# Patient Record
Sex: MALE | NOT HISPANIC OR LATINO | Employment: FULL TIME | URBAN - METROPOLITAN AREA
[De-identification: names, ages, dates, MRNs, and addresses within clinical notes are randomized per-mention and may not be internally consistent; named-entity substitution may affect disease eponyms.]

---

## 2022-04-09 ENCOUNTER — APPOINTMENT (OUTPATIENT)
Dept: RADIOLOGY | Facility: MEDICAL CENTER | Age: 47
DRG: 199 | End: 2022-04-09
Attending: EMERGENCY MEDICINE
Payer: COMMERCIAL

## 2022-04-10 ENCOUNTER — APPOINTMENT (OUTPATIENT)
Dept: RADIOLOGY | Facility: MEDICAL CENTER | Age: 47
DRG: 199 | End: 2022-04-10
Attending: EMERGENCY MEDICINE
Payer: COMMERCIAL

## 2022-04-10 ENCOUNTER — HOSPITAL ENCOUNTER (INPATIENT)
Facility: MEDICAL CENTER | Age: 47
LOS: 4 days | DRG: 199 | End: 2022-04-14
Attending: EMERGENCY MEDICINE | Admitting: SURGERY
Payer: COMMERCIAL

## 2022-04-10 ENCOUNTER — APPOINTMENT (OUTPATIENT)
Dept: RADIOLOGY | Facility: MEDICAL CENTER | Age: 47
DRG: 199 | End: 2022-04-10
Attending: SURGERY
Payer: COMMERCIAL

## 2022-04-10 DIAGNOSIS — S42.022A CLOSED DISPLACED FRACTURE OF SHAFT OF LEFT CLAVICLE, INITIAL ENCOUNTER: ICD-10-CM
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
## 2022-04-10 DIAGNOSIS — S22.42XA CLOSED FRACTURE OF MULTIPLE RIBS OF LEFT SIDE, INITIAL ENCOUNTER: ICD-10-CM

## 2022-04-10 DIAGNOSIS — J94.2 HEMOPNEUMOTHORAX: ICD-10-CM

## 2022-04-10 DIAGNOSIS — R09.02 HYPOXIA: ICD-10-CM

## 2022-04-10 PROBLEM — Z11.52 ENCOUNTER FOR SCREENING LABORATORY TESTING FOR COVID-19 VIRUS: Status: ACTIVE | Noted: 2022-04-10

## 2022-04-10 PROBLEM — S22.5XXA CLOSED FRACTURE OF MULTIPLE RIBS WITH FLAIL CHEST: Status: ACTIVE | Noted: 2022-04-10

## 2022-04-10 PROBLEM — T14.90XA TRAUMA: Status: ACTIVE | Noted: 2022-04-10

## 2022-04-10 PROBLEM — S22.43XA CLOSED FRACTURE OF MULTIPLE RIBS OF BOTH SIDES: Status: ACTIVE | Noted: 2022-04-10

## 2022-04-10 PROBLEM — S27.321A LEFT PULMONARY CONTUSION: Status: ACTIVE | Noted: 2022-04-10

## 2022-04-10 PROBLEM — S27.2XXA TRAUMATIC HEMO-PNEUMOTHORAX: Status: ACTIVE | Noted: 2022-04-10

## 2022-04-10 PROBLEM — Z53.09 CONTRAINDICATION TO DEEP VEIN THROMBOSIS (DVT) PROPHYLAXIS: Status: ACTIVE | Noted: 2022-04-10

## 2022-04-10 LAB
ABO GROUP BLD: NORMAL
ALBUMIN SERPL BCP-MCNC: 4.2 G/DL (ref 3.2–4.9)
ALBUMIN/GLOB SERPL: 1.2 G/DL
ALP SERPL-CCNC: 141 U/L (ref 30–99)
ALT SERPL-CCNC: 39 U/L (ref 2–50)
ANION GAP SERPL CALC-SCNC: 13 MMOL/L (ref 7–16)
APTT PPP: 24.2 SEC (ref 24.7–36)
AST SERPL-CCNC: 43 U/L (ref 12–45)
BILIRUB SERPL-MCNC: 0.3 MG/DL (ref 0.1–1.5)
BLD GP AB SCN SERPL QL: NORMAL
BUN SERPL-MCNC: 15 MG/DL (ref 8–22)
CALCIUM SERPL-MCNC: 9.7 MG/DL (ref 8.5–10.5)
CHLORIDE SERPL-SCNC: 102 MMOL/L (ref 96–112)
CO2 SERPL-SCNC: 21 MMOL/L (ref 20–33)
CREAT SERPL-MCNC: 0.96 MG/DL (ref 0.5–1.4)
ERYTHROCYTE [DISTWIDTH] IN BLOOD BY AUTOMATED COUNT: 38.1 FL (ref 35.9–50)
ETHANOL BLD-MCNC: <10.1 MG/DL (ref 0–10)
FLUAV RNA SPEC QL NAA+PROBE: NEGATIVE
FLUBV RNA SPEC QL NAA+PROBE: NEGATIVE
GFR SERPLBLD CREATININE-BSD FMLA CKD-EPI: 99 ML/MIN/1.73 M 2
GLOBULIN SER CALC-MCNC: 3.6 G/DL (ref 1.9–3.5)
GLUCOSE BLD STRIP.AUTO-MCNC: 123 MG/DL (ref 65–99)
GLUCOSE BLD STRIP.AUTO-MCNC: 138 MG/DL (ref 65–99)
GLUCOSE SERPL-MCNC: 135 MG/DL (ref 65–99)
HCT VFR BLD AUTO: 45.6 % (ref 42–52)
HGB BLD-MCNC: 15.4 G/DL (ref 14–18)
INR PPP: 1.03 (ref 0.87–1.13)
MAGNESIUM SERPL-MCNC: 1.8 MG/DL (ref 1.5–2.5)
MCH RBC QN AUTO: 27.9 PG (ref 27–33)
MCHC RBC AUTO-ENTMCNC: 33.8 G/DL (ref 33.7–35.3)
MCV RBC AUTO: 82.6 FL (ref 81.4–97.8)
PHOSPHATE SERPL-MCNC: 2.8 MG/DL (ref 2.5–4.5)
PLATELET # BLD AUTO: 406 K/UL (ref 164–446)
PMV BLD AUTO: 10.6 FL (ref 9–12.9)
POTASSIUM SERPL-SCNC: 3.5 MMOL/L (ref 3.6–5.5)
PROT SERPL-MCNC: 7.8 G/DL (ref 6–8.2)
PROTHROMBIN TIME: 13.2 SEC (ref 12–14.6)
RBC # BLD AUTO: 5.52 M/UL (ref 4.7–6.1)
RH BLD: NORMAL
RSV RNA SPEC QL NAA+PROBE: NEGATIVE
SARS-COV-2 RNA RESP QL NAA+PROBE: NOTDETECTED
SODIUM SERPL-SCNC: 136 MMOL/L (ref 135–145)
SPECIMEN SOURCE: NORMAL
WBC # BLD AUTO: 20.6 K/UL (ref 4.8–10.8)

## 2022-04-10 PROCEDURE — 86901 BLOOD TYPING SEROLOGIC RH(D): CPT

## 2022-04-10 PROCEDURE — 72128 CT CHEST SPINE W/O DYE: CPT

## 2022-04-10 PROCEDURE — 700101 HCHG RX REV CODE 250: Performed by: SURGERY

## 2022-04-10 PROCEDURE — 700111 HCHG RX REV CODE 636 W/ 250 OVERRIDE (IP): Performed by: NURSE PRACTITIONER

## 2022-04-10 PROCEDURE — 85027 COMPLETE CBC AUTOMATED: CPT

## 2022-04-10 PROCEDURE — 82962 GLUCOSE BLOOD TEST: CPT | Mod: 91

## 2022-04-10 PROCEDURE — 73030 X-RAY EXAM OF SHOULDER: CPT | Mod: LT

## 2022-04-10 PROCEDURE — 0241U HCHG SARS-COV-2 COVID-19 NFCT DS RESP RNA 4 TRGT MIC: CPT

## 2022-04-10 PROCEDURE — 82077 ASSAY SPEC XCP UR&BREATH IA: CPT

## 2022-04-10 PROCEDURE — 700111 HCHG RX REV CODE 636 W/ 250 OVERRIDE (IP): Performed by: SURGERY

## 2022-04-10 PROCEDURE — 85610 PROTHROMBIN TIME: CPT

## 2022-04-10 PROCEDURE — 83735 ASSAY OF MAGNESIUM: CPT

## 2022-04-10 PROCEDURE — 85730 THROMBOPLASTIN TIME PARTIAL: CPT

## 2022-04-10 PROCEDURE — 72125 CT NECK SPINE W/O DYE: CPT

## 2022-04-10 PROCEDURE — 96374 THER/PROPH/DIAG INJ IV PUSH: CPT

## 2022-04-10 PROCEDURE — 72131 CT LUMBAR SPINE W/O DYE: CPT

## 2022-04-10 PROCEDURE — 3E0234Z INTRODUCTION OF SERUM, TOXOID AND VACCINE INTO MUSCLE, PERCUTANEOUS APPROACH: ICD-10-PCS | Performed by: EMERGENCY MEDICINE

## 2022-04-10 PROCEDURE — 99291 CRITICAL CARE FIRST HOUR: CPT

## 2022-04-10 PROCEDURE — G0390 TRAUMA RESPONS W/HOSP CRITI: HCPCS

## 2022-04-10 PROCEDURE — 700111 HCHG RX REV CODE 636 W/ 250 OVERRIDE (IP): Performed by: EMERGENCY MEDICINE

## 2022-04-10 PROCEDURE — 71260 CT THORAX DX C+: CPT

## 2022-04-10 PROCEDURE — 72170 X-RAY EXAM OF PELVIS: CPT

## 2022-04-10 PROCEDURE — 90471 IMMUNIZATION ADMIN: CPT

## 2022-04-10 PROCEDURE — 70486 CT MAXILLOFACIAL W/O DYE: CPT

## 2022-04-10 PROCEDURE — 84100 ASSAY OF PHOSPHORUS: CPT

## 2022-04-10 PROCEDURE — 700117 HCHG RX CONTRAST REV CODE 255: Performed by: EMERGENCY MEDICINE

## 2022-04-10 PROCEDURE — 70450 CT HEAD/BRAIN W/O DYE: CPT

## 2022-04-10 PROCEDURE — 94669 MECHANICAL CHEST WALL OSCILL: CPT

## 2022-04-10 PROCEDURE — 86900 BLOOD TYPING SEROLOGIC ABO: CPT

## 2022-04-10 PROCEDURE — 770022 HCHG ROOM/CARE - ICU (200)

## 2022-04-10 PROCEDURE — C9803 HOPD COVID-19 SPEC COLLECT: HCPCS | Performed by: PHYSICIAN ASSISTANT

## 2022-04-10 PROCEDURE — 71045 X-RAY EXAM CHEST 1 VIEW: CPT

## 2022-04-10 PROCEDURE — A9270 NON-COVERED ITEM OR SERVICE: HCPCS | Performed by: SURGERY

## 2022-04-10 PROCEDURE — 700102 HCHG RX REV CODE 250 W/ 637 OVERRIDE(OP): Performed by: SURGERY

## 2022-04-10 PROCEDURE — 90715 TDAP VACCINE 7 YRS/> IM: CPT | Performed by: EMERGENCY MEDICINE

## 2022-04-10 PROCEDURE — 700105 HCHG RX REV CODE 258: Performed by: SURGERY

## 2022-04-10 PROCEDURE — 99222 1ST HOSP IP/OBS MODERATE 55: CPT | Performed by: ORTHOPAEDIC SURGERY

## 2022-04-10 PROCEDURE — 99291 CRITICAL CARE FIRST HOUR: CPT | Performed by: SURGERY

## 2022-04-10 PROCEDURE — 86850 RBC ANTIBODY SCREEN: CPT

## 2022-04-10 PROCEDURE — 80053 COMPREHEN METABOLIC PANEL: CPT

## 2022-04-10 RX ORDER — AMOXICILLIN 250 MG
1 CAPSULE ORAL NIGHTLY
Status: DISCONTINUED | OUTPATIENT
Start: 2022-04-10 | End: 2022-04-14 | Stop reason: HOSPADM

## 2022-04-10 RX ORDER — DEXTROSE MONOHYDRATE 25 G/50ML
25 INJECTION, SOLUTION INTRAVENOUS
Status: DISCONTINUED | OUTPATIENT
Start: 2022-04-10 | End: 2022-04-13

## 2022-04-10 RX ORDER — HYDROMORPHONE HYDROCHLORIDE 1 MG/ML
.5-1 INJECTION, SOLUTION INTRAMUSCULAR; INTRAVENOUS; SUBCUTANEOUS
Status: DISCONTINUED | OUTPATIENT
Start: 2022-04-10 | End: 2022-04-12

## 2022-04-10 RX ORDER — ACETAMINOPHEN 500 MG
1000 TABLET ORAL EVERY 6 HOURS
Status: DISCONTINUED | OUTPATIENT
Start: 2022-04-10 | End: 2022-04-14 | Stop reason: HOSPADM

## 2022-04-10 RX ORDER — METAXALONE 800 MG/1
800 TABLET ORAL 3 TIMES DAILY
Status: DISCONTINUED | OUTPATIENT
Start: 2022-04-10 | End: 2022-04-14 | Stop reason: HOSPADM

## 2022-04-10 RX ORDER — BISACODYL 10 MG
10 SUPPOSITORY, RECTAL RECTAL
Status: DISCONTINUED | OUTPATIENT
Start: 2022-04-10 | End: 2022-04-14 | Stop reason: HOSPADM

## 2022-04-10 RX ORDER — POLYETHYLENE GLYCOL 3350 17 G/17G
1 POWDER, FOR SOLUTION ORAL 2 TIMES DAILY
Status: DISCONTINUED | OUTPATIENT
Start: 2022-04-10 | End: 2022-04-14 | Stop reason: HOSPADM

## 2022-04-10 RX ORDER — SODIUM CHLORIDE, SODIUM LACTATE, POTASSIUM CHLORIDE, CALCIUM CHLORIDE 600; 310; 30; 20 MG/100ML; MG/100ML; MG/100ML; MG/100ML
INJECTION, SOLUTION INTRAVENOUS CONTINUOUS
Status: DISCONTINUED | OUTPATIENT
Start: 2022-04-10 | End: 2022-04-13

## 2022-04-10 RX ORDER — OXYCODONE HYDROCHLORIDE 10 MG/1
10 TABLET ORAL
Status: DISCONTINUED | OUTPATIENT
Start: 2022-04-10 | End: 2022-04-13

## 2022-04-10 RX ORDER — CELECOXIB 200 MG/1
200 CAPSULE ORAL 2 TIMES DAILY
Status: DISCONTINUED | OUTPATIENT
Start: 2022-04-10 | End: 2022-04-14 | Stop reason: HOSPADM

## 2022-04-10 RX ORDER — IBUPROFEN 200 MG
800 TABLET ORAL EVERY 8 HOURS PRN
Status: ON HOLD | COMMUNITY
End: 2022-04-14

## 2022-04-10 RX ORDER — AMOXICILLIN 250 MG
1 CAPSULE ORAL
Status: DISCONTINUED | OUTPATIENT
Start: 2022-04-10 | End: 2022-04-14 | Stop reason: HOSPADM

## 2022-04-10 RX ORDER — ONDANSETRON 2 MG/ML
4 INJECTION INTRAMUSCULAR; INTRAVENOUS EVERY 4 HOURS PRN
Status: DISCONTINUED | OUTPATIENT
Start: 2022-04-10 | End: 2022-04-13

## 2022-04-10 RX ORDER — OXYCODONE HYDROCHLORIDE 10 MG/1
10 TABLET ORAL
Status: DISCONTINUED | OUTPATIENT
Start: 2022-04-10 | End: 2022-04-10

## 2022-04-10 RX ORDER — ACETAMINOPHEN 500 MG
1000 TABLET ORAL EVERY 6 HOURS PRN
Status: DISCONTINUED | OUTPATIENT
Start: 2022-04-15 | End: 2022-04-14 | Stop reason: HOSPADM

## 2022-04-10 RX ORDER — FAMOTIDINE 20 MG/1
20 TABLET, FILM COATED ORAL 2 TIMES DAILY
Status: DISCONTINUED | OUTPATIENT
Start: 2022-04-10 | End: 2022-04-13

## 2022-04-10 RX ORDER — ENEMA 19; 7 G/133ML; G/133ML
1 ENEMA RECTAL
Status: DISCONTINUED | OUTPATIENT
Start: 2022-04-10 | End: 2022-04-14 | Stop reason: HOSPADM

## 2022-04-10 RX ORDER — OXYCODONE HYDROCHLORIDE 5 MG/1
5 TABLET ORAL
Status: DISCONTINUED | OUTPATIENT
Start: 2022-04-10 | End: 2022-04-10

## 2022-04-10 RX ORDER — HYDROMORPHONE HYDROCHLORIDE 1 MG/ML
0.5 INJECTION, SOLUTION INTRAMUSCULAR; INTRAVENOUS; SUBCUTANEOUS
Status: DISCONTINUED | OUTPATIENT
Start: 2022-04-10 | End: 2022-04-10

## 2022-04-10 RX ORDER — HYDROMORPHONE HYDROCHLORIDE 1 MG/ML
1 INJECTION, SOLUTION INTRAMUSCULAR; INTRAVENOUS; SUBCUTANEOUS ONCE
Status: COMPLETED | OUTPATIENT
Start: 2022-04-10 | End: 2022-04-10

## 2022-04-10 RX ORDER — DOCUSATE SODIUM 100 MG/1
100 CAPSULE, LIQUID FILLED ORAL 2 TIMES DAILY
Status: DISCONTINUED | OUTPATIENT
Start: 2022-04-10 | End: 2022-04-14 | Stop reason: HOSPADM

## 2022-04-10 RX ORDER — CELECOXIB 200 MG/1
200 CAPSULE ORAL 2 TIMES DAILY PRN
Status: DISCONTINUED | OUTPATIENT
Start: 2022-04-15 | End: 2022-04-14 | Stop reason: HOSPADM

## 2022-04-10 RX ORDER — OXYCODONE HYDROCHLORIDE 5 MG/1
5 TABLET ORAL
Status: DISCONTINUED | OUTPATIENT
Start: 2022-04-10 | End: 2022-04-13

## 2022-04-10 RX ORDER — LIDOCAINE 50 MG/G
3 PATCH TOPICAL EVERY 24 HOURS
Status: DISCONTINUED | OUTPATIENT
Start: 2022-04-10 | End: 2022-04-14 | Stop reason: HOSPADM

## 2022-04-10 RX ORDER — ONDANSETRON 4 MG/1
4 TABLET, ORALLY DISINTEGRATING ORAL EVERY 4 HOURS PRN
Status: DISCONTINUED | OUTPATIENT
Start: 2022-04-10 | End: 2022-04-14 | Stop reason: HOSPADM

## 2022-04-10 RX ORDER — GABAPENTIN 100 MG/1
100 CAPSULE ORAL EVERY 8 HOURS
Status: DISCONTINUED | OUTPATIENT
Start: 2022-04-10 | End: 2022-04-14 | Stop reason: HOSPADM

## 2022-04-10 RX ADMIN — OXYCODONE HYDROCHLORIDE 10 MG: 10 TABLET ORAL at 21:43

## 2022-04-10 RX ADMIN — METAXALONE 800 MG: 800 TABLET ORAL at 17:08

## 2022-04-10 RX ADMIN — CLOSTRIDIUM TETANI TOXOID ANTIGEN (FORMALDEHYDE INACTIVATED), CORYNEBACTERIUM DIPHTHERIAE TOXOID ANTIGEN (FORMALDEHYDE INACTIVATED), BORDETELLA PERTUSSIS TOXOID ANTIGEN (GLUTARALDEHYDE INACTIVATED), BORDETELLA PERTUSSIS FILAMENTOUS HEMAGGLUTININ ANTIGEN (FORMALDEHYDE INACTIVATED), BORDETELLA PERTUSSIS PERTACTIN ANTIGEN, AND BORDETELLA PERTUSSIS FIMBRIAE 2/3 ANTIGEN 0.5 ML: 5; 2; 2.5; 5; 3; 5 INJECTION, SUSPENSION INTRAMUSCULAR at 14:47

## 2022-04-10 RX ADMIN — OXYCODONE 5 MG: 5 TABLET ORAL at 18:13

## 2022-04-10 RX ADMIN — HYDROMORPHONE HYDROCHLORIDE 1 MG: 1 INJECTION, SOLUTION INTRAMUSCULAR; INTRAVENOUS; SUBCUTANEOUS at 23:16

## 2022-04-10 RX ADMIN — HYDROMORPHONE HYDROCHLORIDE 0.5 MG: 1 INJECTION, SOLUTION INTRAMUSCULAR; INTRAVENOUS; SUBCUTANEOUS at 20:10

## 2022-04-10 RX ADMIN — HYDROMORPHONE HYDROCHLORIDE 1 MG: 1 INJECTION, SOLUTION INTRAMUSCULAR; INTRAVENOUS; SUBCUTANEOUS at 14:23

## 2022-04-10 RX ADMIN — GABAPENTIN 100 MG: 100 CAPSULE ORAL at 16:56

## 2022-04-10 RX ADMIN — HYDROMORPHONE HYDROCHLORIDE 0.5 MG: 1 INJECTION, SOLUTION INTRAMUSCULAR; INTRAVENOUS; SUBCUTANEOUS at 16:20

## 2022-04-10 RX ADMIN — SENNOSIDES AND DOCUSATE SODIUM 1 TABLET: 50; 8.6 TABLET ORAL at 20:10

## 2022-04-10 RX ADMIN — LIDOCAINE 3 PATCH: 50 PATCH TOPICAL at 16:57

## 2022-04-10 RX ADMIN — DOCUSATE SODIUM 100 MG: 100 CAPSULE, LIQUID FILLED ORAL at 17:08

## 2022-04-10 RX ADMIN — SODIUM CHLORIDE, POTASSIUM CHLORIDE, SODIUM LACTATE AND CALCIUM CHLORIDE: 600; 310; 30; 20 INJECTION, SOLUTION INTRAVENOUS at 16:50

## 2022-04-10 RX ADMIN — IOHEXOL 100 ML: 350 INJECTION, SOLUTION INTRAVENOUS at 14:19

## 2022-04-10 RX ADMIN — CELECOXIB 200 MG: 200 CAPSULE ORAL at 17:08

## 2022-04-10 RX ADMIN — FAMOTIDINE 20 MG: 20 TABLET ORAL at 17:08

## 2022-04-10 RX ADMIN — ACETAMINOPHEN 1000 MG: 500 TABLET ORAL at 23:16

## 2022-04-10 RX ADMIN — ACETAMINOPHEN 1000 MG: 500 TABLET ORAL at 17:08

## 2022-04-10 RX ADMIN — GABAPENTIN 100 MG: 100 CAPSULE ORAL at 21:45

## 2022-04-10 ASSESSMENT — COGNITIVE AND FUNCTIONAL STATUS - GENERAL
CLIMB 3 TO 5 STEPS WITH RAILING: A LOT
STANDING UP FROM CHAIR USING ARMS: A LOT
TOILETING: A LITTLE
DAILY ACTIVITIY SCORE: 16
WALKING IN HOSPITAL ROOM: A LOT
HELP NEEDED FOR BATHING: A LOT
MOVING TO AND FROM BED TO CHAIR: A LOT
MOBILITY SCORE: 12
DRESSING REGULAR LOWER BODY CLOTHING: A LOT
TURNING FROM BACK TO SIDE WHILE IN FLAT BAD: A LOT
EATING MEALS: A LITTLE
SUGGESTED CMS G CODE MODIFIER DAILY ACTIVITY: CK
DRESSING REGULAR UPPER BODY CLOTHING: A LITTLE
SUGGESTED CMS G CODE MODIFIER MOBILITY: CL
PERSONAL GROOMING: A LITTLE
MOVING FROM LYING ON BACK TO SITTING ON SIDE OF FLAT BED: A LOT

## 2022-04-10 ASSESSMENT — PAIN DESCRIPTION - PAIN TYPE
TYPE: ACUTE PAIN

## 2022-04-10 ASSESSMENT — LIFESTYLE VARIABLES
EVER FELT BAD OR GUILTY ABOUT YOUR DRINKING: NO
TOTAL SCORE: 0
ALCOHOL_USE: YES
HAVE YOU EVER FELT YOU SHOULD CUT DOWN ON YOUR DRINKING: NO
EVER HAD A DRINK FIRST THING IN THE MORNING TO STEADY YOUR NERVES TO GET RID OF A HANGOVER: NO
CONSUMPTION TOTAL: NEGATIVE
DOES PATIENT WANT TO STOP DRINKING: NO
TOTAL SCORE: 0
AVERAGE NUMBER OF DAYS PER WEEK YOU HAVE A DRINK CONTAINING ALCOHOL: 0.5
ON A TYPICAL DAY WHEN YOU DRINK ALCOHOL HOW MANY DRINKS DO YOU HAVE: 2
HOW MANY TIMES IN THE PAST YEAR HAVE YOU HAD 5 OR MORE DRINKS IN A DAY: 0
HAVE PEOPLE ANNOYED YOU BY CRITICIZING YOUR DRINKING: NO
TOTAL SCORE: 0

## 2022-04-10 ASSESSMENT — COPD QUESTIONNAIRES
DO YOU EVER COUGH UP ANY MUCUS OR PHLEGM?: NO/ONLY WITH OCCASIONAL COLDS OR INFECTIONS
DURING THE PAST 4 WEEKS HOW MUCH DID YOU FEEL SHORT OF BREATH: NONE/LITTLE OF THE TIME
COPD SCREENING SCORE: 2
HAVE YOU SMOKED AT LEAST 100 CIGARETTES IN YOUR ENTIRE LIFE: YES

## 2022-04-10 ASSESSMENT — PATIENT HEALTH QUESTIONNAIRE - PHQ9
2. FEELING DOWN, DEPRESSED, IRRITABLE, OR HOPELESS: NOT AT ALL
1. LITTLE INTEREST OR PLEASURE IN DOING THINGS: NOT AT ALL
SUM OF ALL RESPONSES TO PHQ9 QUESTIONS 1 AND 2: 0

## 2022-04-10 ASSESSMENT — FIBROSIS 4 INDEX: FIB4 SCORE: 0.76

## 2022-04-10 NOTE — PROGRESS NOTES
Orthopaedics  Aware of admit. Chart, X-rays labs all reviewed  Formal consult to follow  Non op clavicle fracture  Multiple rib fractures

## 2022-04-10 NOTE — ASSESSMENT & PLAN NOTE
Left 1-9 rib fractures - flail segment involving ribs 3, 4,and 5.  Right 1st rib fracture.  Aggressive pulmonary hygiene and multimodal pain management and serial chest radiography.
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
## 2022-04-10 NOTE — ASSESSMENT & PLAN NOTE
Small left hemopneumothorax.  Aggressive pulmonary hygiene and multimodal pain management and serial chest radiography.
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
## 2022-04-10 NOTE — ASSESSMENT & PLAN NOTE
Left upper and lower lobes.  Supplemental oxygen to maintain O2 saturation >93%.  Aggressive pulmonary hygiene. Serial chest radiographs.
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
## 2022-04-10 NOTE — PROGRESS NOTES
Ortho  Aware of admission. Chart, X-rays reviewed  Non op clavicle fracture  Multiple rib fractures  Formal consult to follow  Pickens

## 2022-04-10 NOTE — ASSESSMENT & PLAN NOTE
Midshaft, with minimal displacement.  Non-operative management.   Sling for comfort.  Weight bearing status - Nonweightbearing KWADWO Pickens MD. Orthopedic Surgeon. [facility].

## 2022-04-11 ENCOUNTER — APPOINTMENT (OUTPATIENT)
Dept: RADIOLOGY | Facility: MEDICAL CENTER | Age: 47
DRG: 199 | End: 2022-04-11
Attending: SURGERY
Payer: COMMERCIAL

## 2022-04-11 LAB
ALBUMIN SERPL BCP-MCNC: 3.7 G/DL (ref 3.2–4.9)
ALBUMIN/GLOB SERPL: 1.1 G/DL
ALP SERPL-CCNC: 120 U/L (ref 30–99)
ALT SERPL-CCNC: 41 U/L (ref 2–50)
ANION GAP SERPL CALC-SCNC: 13 MMOL/L (ref 7–16)
AST SERPL-CCNC: 59 U/L (ref 12–45)
BASOPHILS # BLD AUTO: 0.2 % (ref 0–1.8)
BASOPHILS # BLD: 0.03 K/UL (ref 0–0.12)
BILIRUB SERPL-MCNC: 0.4 MG/DL (ref 0.1–1.5)
BUN SERPL-MCNC: 20 MG/DL (ref 8–22)
CALCIUM SERPL-MCNC: 9.1 MG/DL (ref 8.5–10.5)
CHLORIDE SERPL-SCNC: 102 MMOL/L (ref 96–112)
CO2 SERPL-SCNC: 20 MMOL/L (ref 20–33)
CREAT SERPL-MCNC: 0.98 MG/DL (ref 0.5–1.4)
EOSINOPHIL # BLD AUTO: 0.02 K/UL (ref 0–0.51)
EOSINOPHIL NFR BLD: 0.2 % (ref 0–6.9)
ERYTHROCYTE [DISTWIDTH] IN BLOOD BY AUTOMATED COUNT: 39.9 FL (ref 35.9–50)
GFR SERPLBLD CREATININE-BSD FMLA CKD-EPI: 97 ML/MIN/1.73 M 2
GLOBULIN SER CALC-MCNC: 3.4 G/DL (ref 1.9–3.5)
GLUCOSE BLD STRIP.AUTO-MCNC: 122 MG/DL (ref 65–99)
GLUCOSE BLD STRIP.AUTO-MCNC: 129 MG/DL (ref 65–99)
GLUCOSE BLD STRIP.AUTO-MCNC: 140 MG/DL (ref 65–99)
GLUCOSE BLD STRIP.AUTO-MCNC: 98 MG/DL (ref 65–99)
GLUCOSE SERPL-MCNC: 133 MG/DL (ref 65–99)
HCT VFR BLD AUTO: 42.2 % (ref 42–52)
HGB BLD-MCNC: 13.8 G/DL (ref 14–18)
IMM GRANULOCYTES # BLD AUTO: 0.06 K/UL (ref 0–0.11)
IMM GRANULOCYTES NFR BLD AUTO: 0.5 % (ref 0–0.9)
LYMPHOCYTES # BLD AUTO: 1.63 K/UL (ref 1–4.8)
LYMPHOCYTES NFR BLD: 12.5 % (ref 22–41)
MAGNESIUM SERPL-MCNC: 1.8 MG/DL (ref 1.5–2.5)
MCH RBC QN AUTO: 27.5 PG (ref 27–33)
MCHC RBC AUTO-ENTMCNC: 32.7 G/DL (ref 33.7–35.3)
MCV RBC AUTO: 84.1 FL (ref 81.4–97.8)
MONOCYTES # BLD AUTO: 1.44 K/UL (ref 0–0.85)
MONOCYTES NFR BLD AUTO: 11 % (ref 0–13.4)
NEUTROPHILS # BLD AUTO: 9.86 K/UL (ref 1.82–7.42)
NEUTROPHILS NFR BLD: 75.6 % (ref 44–72)
NRBC # BLD AUTO: 0 K/UL
NRBC BLD-RTO: 0 /100 WBC
PHOSPHATE SERPL-MCNC: 4.8 MG/DL (ref 2.5–4.5)
PLATELET # BLD AUTO: 331 K/UL (ref 164–446)
PMV BLD AUTO: 10.6 FL (ref 9–12.9)
POTASSIUM SERPL-SCNC: 4.6 MMOL/L (ref 3.6–5.5)
PROT SERPL-MCNC: 7.1 G/DL (ref 6–8.2)
RBC # BLD AUTO: 5.02 M/UL (ref 4.7–6.1)
SODIUM SERPL-SCNC: 135 MMOL/L (ref 135–145)
WBC # BLD AUTO: 13 K/UL (ref 4.8–10.8)

## 2022-04-11 PROCEDURE — 80053 COMPREHEN METABOLIC PANEL: CPT

## 2022-04-11 PROCEDURE — 97162 PT EVAL MOD COMPLEX 30 MIN: CPT

## 2022-04-11 PROCEDURE — A9270 NON-COVERED ITEM OR SERVICE: HCPCS | Performed by: SURGERY

## 2022-04-11 PROCEDURE — 84100 ASSAY OF PHOSPHORUS: CPT

## 2022-04-11 PROCEDURE — 85025 COMPLETE CBC W/AUTO DIFF WBC: CPT

## 2022-04-11 PROCEDURE — 700102 HCHG RX REV CODE 250 W/ 637 OVERRIDE(OP): Performed by: SURGERY

## 2022-04-11 PROCEDURE — 700102 HCHG RX REV CODE 250 W/ 637 OVERRIDE(OP): Performed by: NURSE PRACTITIONER

## 2022-04-11 PROCEDURE — 700111 HCHG RX REV CODE 636 W/ 250 OVERRIDE (IP): Performed by: NURSE PRACTITIONER

## 2022-04-11 PROCEDURE — 700111 HCHG RX REV CODE 636 W/ 250 OVERRIDE (IP): Performed by: SURGERY

## 2022-04-11 PROCEDURE — 83735 ASSAY OF MAGNESIUM: CPT

## 2022-04-11 PROCEDURE — 94669 MECHANICAL CHEST WALL OSCILL: CPT

## 2022-04-11 PROCEDURE — 71045 X-RAY EXAM CHEST 1 VIEW: CPT

## 2022-04-11 PROCEDURE — 94760 N-INVAS EAR/PLS OXIMETRY 1: CPT

## 2022-04-11 PROCEDURE — 82962 GLUCOSE BLOOD TEST: CPT

## 2022-04-11 PROCEDURE — 700105 HCHG RX REV CODE 258: Performed by: SURGERY

## 2022-04-11 PROCEDURE — 99233 SBSQ HOSP IP/OBS HIGH 50: CPT | Performed by: SURGERY

## 2022-04-11 PROCEDURE — A9270 NON-COVERED ITEM OR SERVICE: HCPCS | Performed by: NURSE PRACTITIONER

## 2022-04-11 PROCEDURE — 770022 HCHG ROOM/CARE - ICU (200)

## 2022-04-11 PROCEDURE — 97166 OT EVAL MOD COMPLEX 45 MIN: CPT

## 2022-04-11 PROCEDURE — 700101 HCHG RX REV CODE 250: Performed by: SURGERY

## 2022-04-11 RX ORDER — MORPHINE SULFATE 15 MG/1
15 TABLET, FILM COATED, EXTENDED RELEASE ORAL EVERY 12 HOURS
Status: DISCONTINUED | OUTPATIENT
Start: 2022-04-11 | End: 2022-04-14 | Stop reason: HOSPADM

## 2022-04-11 RX ORDER — DIPHENHYDRAMINE HCL 25 MG
25 TABLET ORAL EVERY 6 HOURS PRN
Status: DISCONTINUED | OUTPATIENT
Start: 2022-04-11 | End: 2022-04-13

## 2022-04-11 RX ADMIN — MORPHINE SULFATE 15 MG: 15 TABLET, FILM COATED, EXTENDED RELEASE ORAL at 20:54

## 2022-04-11 RX ADMIN — METAXALONE 800 MG: 800 TABLET ORAL at 05:18

## 2022-04-11 RX ADMIN — MAGNESIUM HYDROXIDE 30 ML: 400 SUSPENSION ORAL at 05:18

## 2022-04-11 RX ADMIN — CELECOXIB 200 MG: 200 CAPSULE ORAL at 05:18

## 2022-04-11 RX ADMIN — HYDROMORPHONE HYDROCHLORIDE 1 MG: 1 INJECTION, SOLUTION INTRAMUSCULAR; INTRAVENOUS; SUBCUTANEOUS at 03:11

## 2022-04-11 RX ADMIN — METAXALONE 800 MG: 800 TABLET ORAL at 17:33

## 2022-04-11 RX ADMIN — ONDANSETRON 4 MG: 2 INJECTION INTRAMUSCULAR; INTRAVENOUS at 17:36

## 2022-04-11 RX ADMIN — ENOXAPARIN SODIUM 40 MG: 40 INJECTION SUBCUTANEOUS at 17:34

## 2022-04-11 RX ADMIN — FAMOTIDINE 20 MG: 20 TABLET ORAL at 05:18

## 2022-04-11 RX ADMIN — OXYCODONE HYDROCHLORIDE 10 MG: 10 TABLET ORAL at 02:00

## 2022-04-11 RX ADMIN — OXYCODONE HYDROCHLORIDE 10 MG: 10 TABLET ORAL at 07:44

## 2022-04-11 RX ADMIN — METAXALONE 800 MG: 800 TABLET ORAL at 11:46

## 2022-04-11 RX ADMIN — FAMOTIDINE 20 MG: 20 TABLET ORAL at 17:34

## 2022-04-11 RX ADMIN — ENOXAPARIN SODIUM 40 MG: 40 INJECTION SUBCUTANEOUS at 05:19

## 2022-04-11 RX ADMIN — ACETAMINOPHEN 1000 MG: 500 TABLET ORAL at 05:19

## 2022-04-11 RX ADMIN — DIPHENHYDRAMINE HYDROCHLORIDE 25 MG: 25 TABLET ORAL at 17:34

## 2022-04-11 RX ADMIN — POLYETHYLENE GLYCOL 3350 1 PACKET: 17 POWDER, FOR SOLUTION ORAL at 05:18

## 2022-04-11 RX ADMIN — LIDOCAINE 3 PATCH: 50 PATCH TOPICAL at 05:18

## 2022-04-11 RX ADMIN — DOCUSATE SODIUM 100 MG: 100 CAPSULE, LIQUID FILLED ORAL at 17:34

## 2022-04-11 RX ADMIN — GABAPENTIN 100 MG: 100 CAPSULE ORAL at 05:19

## 2022-04-11 RX ADMIN — DOCUSATE SODIUM 100 MG: 100 CAPSULE, LIQUID FILLED ORAL at 05:18

## 2022-04-11 RX ADMIN — CELECOXIB 200 MG: 200 CAPSULE ORAL at 17:33

## 2022-04-11 RX ADMIN — ACETAMINOPHEN 1000 MG: 500 TABLET ORAL at 17:34

## 2022-04-11 RX ADMIN — OXYCODONE HYDROCHLORIDE 10 MG: 10 TABLET ORAL at 23:00

## 2022-04-11 RX ADMIN — SODIUM CHLORIDE, POTASSIUM CHLORIDE, SODIUM LACTATE AND CALCIUM CHLORIDE: 600; 310; 30; 20 INJECTION, SOLUTION INTRAVENOUS at 02:32

## 2022-04-11 RX ADMIN — ACETAMINOPHEN 1000 MG: 500 TABLET ORAL at 11:46

## 2022-04-11 RX ADMIN — GABAPENTIN 100 MG: 100 CAPSULE ORAL at 15:00

## 2022-04-11 RX ADMIN — ONDANSETRON 4 MG: 2 INJECTION INTRAMUSCULAR; INTRAVENOUS at 09:24

## 2022-04-11 RX ADMIN — GABAPENTIN 100 MG: 100 CAPSULE ORAL at 23:00

## 2022-04-11 RX ADMIN — MORPHINE SULFATE 15 MG: 15 TABLET, FILM COATED, EXTENDED RELEASE ORAL at 09:14

## 2022-04-11 RX ADMIN — OXYCODONE HYDROCHLORIDE 10 MG: 10 TABLET ORAL at 17:33

## 2022-04-11 ASSESSMENT — ENCOUNTER SYMPTOMS
ABDOMINAL PAIN: 0
SENSORY CHANGE: 0
NECK PAIN: 0
COUGH: 0
NAUSEA: 0
WEAKNESS: 0
SHORTNESS OF BREATH: 0
CHILLS: 0
BACK PAIN: 0
JOINT SWELLING: 1
ARTHRALGIAS: 1
TINGLING: 0
FEVER: 0
MYALGIAS: 1
CHEST TIGHTNESS: 1
VOMITING: 0

## 2022-04-11 ASSESSMENT — GAIT ASSESSMENTS
ASSISTIVE DEVICE: HAND HELD ASSIST
DEVIATION: BRADYKINETIC
GAIT LEVEL OF ASSIST: MINIMAL ASSIST
DISTANCE (FEET): 10

## 2022-04-11 ASSESSMENT — PAIN DESCRIPTION - PAIN TYPE
TYPE: ACUTE PAIN

## 2022-04-11 ASSESSMENT — COGNITIVE AND FUNCTIONAL STATUS - GENERAL
MOVING TO AND FROM BED TO CHAIR: UNABLE
DRESSING REGULAR UPPER BODY CLOTHING: A LITTLE
WALKING IN HOSPITAL ROOM: A LOT
DRESSING REGULAR LOWER BODY CLOTHING: A LOT
TOILETING: A LOT
STANDING UP FROM CHAIR USING ARMS: A LOT
MOVING FROM LYING ON BACK TO SITTING ON SIDE OF FLAT BED: UNABLE
HELP NEEDED FOR BATHING: A LOT
TURNING FROM BACK TO SIDE WHILE IN FLAT BAD: A LOT
DAILY ACTIVITIY SCORE: 15
MOBILITY SCORE: 10
EATING MEALS: A LITTLE
CLIMB 3 TO 5 STEPS WITH RAILING: A LOT
PERSONAL GROOMING: A LITTLE
SUGGESTED CMS G CODE MODIFIER MOBILITY: CL
SUGGESTED CMS G CODE MODIFIER DAILY ACTIVITY: CK

## 2022-04-11 ASSESSMENT — FIBROSIS 4 INDEX: FIB4 SCORE: 0.76

## 2022-04-11 ASSESSMENT — ACTIVITIES OF DAILY LIVING (ADL): TOILETING: INDEPENDENT
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
## 2022-04-11 NOTE — CARE PLAN
Problem: Pain - Standard  Goal: Alleviation of pain or a reduction in pain to the patient’s comfort goal  Outcome: Progressing     Problem: Knowledge Deficit - Standard  Goal: Patient and family/care givers will demonstrate understanding of plan of care, disease process/condition, diagnostic tests and medications  Outcome: Progressing     Problem: Skin Integrity  Goal: Skin integrity is maintained or improved  Outcome: Progressing     Problem: Fall Risk  Goal: Patient will remain free from falls  Outcome: Progressing     The patient is Watcher - Medium risk of patient condition declining or worsening    Shift Goals  Clinical Goals: Pain management; pulmonary toileting  Patient Goals: Pain control rest  Family Goals: Updates    Progress made toward(s) clinical / shift goals:  Pain regimen initiated, educated regarding coughing and deep breathing, will provide education regarding incentive spirometry     Patient is not progressing towards the following goals:
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
## 2022-04-11 NOTE — PROGRESS NOTES
"      Mental status adequate for full examination?: Yes    Spine cleared (radiologically and/or clinically): Yes    REVIEW OF SYSTEMS:  Review of Systems   Constitutional: Negative for chills and fever.   Respiratory: Negative for cough and shortness of breath.    Gastrointestinal: Negative for abdominal pain, nausea and vomiting.   Genitourinary:        Voiding   Musculoskeletal: Positive for myalgias. Negative for back pain and neck pain.   Neurological: Negative for tingling, sensory change and weakness.       PHYSICAL EXAMINATION:  /90   Pulse 95   Temp 36.6 °C (97.9 °F) (Temporal)   Resp (!) 33   Ht 1.829 m (6' 0.01\")   Wt 88.9 kg (195 lb 15.8 oz)   SpO2 95%   BMI 26.57 kg/m²   Physical Exam  Vitals and nursing note reviewed.   Constitutional:       General: He is awake. He is not in acute distress.     Appearance: Normal appearance. He is not ill-appearing.      Interventions: Nasal cannula in place.   HENT:      Head: Normocephalic.      Comments: Ecchymosis left forehead     Right Ear: External ear normal.      Left Ear: External ear normal.      Nose: Nose normal.      Comments: Nose ring     Mouth/Throat:      Lips: Pink.      Mouth: Mucous membranes are moist.      Tongue: Tongue does not deviate from midline.   Eyes:      General: Lids are normal.      Pupils: Pupils are equal, round, and reactive to light.   Cardiovascular:      Rate and Rhythm: Normal rate and regular rhythm.      Pulses: Normal pulses.   Pulmonary:      Effort: Pulmonary effort is normal.      Breath sounds: Examination of the right-lower field reveals decreased breath sounds. Examination of the left-lower field reveals decreased breath sounds. Decreased breath sounds present.   Chest:      Chest wall: Swelling and tenderness present. No crepitus.   Abdominal:      General: There is distension. There are no signs of injury.      Palpations: Abdomen is soft.      Tenderness: There is no abdominal tenderness. "   Musculoskeletal:      Cervical back: No pain with movement, spinous process tenderness or muscular tenderness.      Comments: Moves lower extremities and RUE without irritability  Right knee abrasion  Left upper extremity in sling, DVI  Tenderness diffuse in shoulder and midshaft of clavicle   Skin:     General: Skin is warm and dry.      Capillary Refill: Capillary refill takes less than 2 seconds.   Neurological:      Mental Status: He is alert and oriented to person, place, and time.      GCS: GCS eye subscore is 4. GCS verbal subscore is 5. GCS motor subscore is 6.      Cranial Nerves: Cranial nerves are intact.      Sensory: Sensation is intact.      Motor: Motor function is intact.      Coordination: Coordination is intact.   Psychiatric:         Attention and Perception: Attention normal.         Mood and Affect: Mood normal.         Speech: Speech normal.         Behavior: Behavior is cooperative.         Thought Content: Thought content normal.         Cognition and Memory: Cognition and memory normal.         LABORATORY VALUES:  Recent Labs     04/10/22  1400 04/11/22  0545   WBC 20.6* 13.0*   RBC 5.52 5.02   HEMOGLOBIN 15.4 13.8*   HEMATOCRIT 45.6 42.2   MCV 82.6 84.1   MCH 27.9 27.5   MCHC 33.8 32.7*   RDW 38.1 39.9   PLATELETCT 406 331   MPV 10.6 10.6     Recent Labs     04/10/22  1400 04/11/22  0545   SODIUM 136 135   POTASSIUM 3.5* 4.6   CHLORIDE 102 102   CO2 21 20   GLUCOSE 135* 133*   BUN 15 20   CREATININE 0.96 0.98   CALCIUM 9.7 9.1     Recent Labs     04/10/22  1400 04/11/22  0545   ASTSGOT 43 59*   ALTSGPT 39 41   TBILIRUBIN 0.3 0.4   ALKPHOSPHAT 141* 120*   GLOBULIN 3.6* 3.4   INR 1.03  --      Recent Labs     04/10/22  1400   APTT 24.2*   INR 1.03       IMAGING:  DX-CHEST-PORTABLE (1 VIEW)   Final Result      1.  There is a questionable left lateral pneumothorax as well as seen on prior CT.   2.  Parenchymal opacities unchanged which could be areas of contusion and or atelectasis.   3.   Left rib fractures and left clavicle fracture again seen.      DX-CHEST-LIMITED (1 VIEW)   Final Result      1.  No significant change      2.  Left rib and left clavicle fracture      3.  Left pulmonary contusion      4.  Pneumothorax and hemothorax not identified radiographically      CT-CSPINE WITHOUT PLUS RECONS   Final Result      1.  No acute fracture or dislocation of the cervical spine.   2.  Rib fractures and small left hemothorax detailed on separate report.      CT-LSPINE W/O PLUS RECONS   Final Result      1.  No acute fracture or dislocation of the lumbar spine.   2.  Bilateral sacroiliac ankylosis.      CT-MAXILLOFACIAL W/O PLUS RECONS   Final Result      No acute facial fracture.      CT-TSPINE W/O PLUS RECONS   Final Result      Negative for thoracic spine fracture or subluxation      CT-CHEST,ABDOMEN,PELVIS WITH   Final Result      1.  Left 1st through 9th rib fractures some of which are segmental, left clavicle fracture, and right 1st rib fracture      2.  Small left hemopneumothorax      3.  Bilateral atelectasis      4.  Hepatic steatosis and hepatomegaly      5.  Findings were discussed with LEIGHTON ABEBE on 4/10/2022 2:30 PM.      CT-HEAD W/O   Final Result      No acute intracranial abnormality.                  DX-SHOULDER 2+ LEFT   Final Result      1.  Left mid clavicle fracture      2.  Multiple left rib fracture      3.  Left pulmonary contusion      DX-CHEST-LIMITED (1 VIEW)   Final Result      1.  Multiple left rib fracture and probable left clavicle fracture      2.  Left mid and upper lung zone airspace process consistent with pulmonary contusion      3.  Enlarged cardiac silhouette      DX-PELVIS-1 OR 2 VIEWS   Final Result      No pelvic or proximal femoral fracture identified          All current laboratory studies/radiology exams reviewed: Yes    Completed Consultations:  Orthopedic surgery     Pending Consultations:  N/A    Newly Identified Diagnoses and  Injuries:  None    RAP Score Total: 4      ETOH Screening  CAGE Score: 0  Assessment complete date: 4/11/2022 (MEI on admit / cage not completed / screening negative)        Discussed patient condition with RN, Patient and trauma surgery. Dr. Ruiz and Dr. Jefferson
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
## 2022-04-11 NOTE — CARE PLAN
Problem: Pain - Standard  Goal: Alleviation of pain or a reduction in pain to the patient’s comfort goal  Outcome: Not Met  Pt in severe pain with movement, medicated according to pain management plan     Problem: Knowledge Deficit - Standard  Goal: Patient and family/care givers will demonstrate understanding of plan of care, disease process/condition, diagnostic tests and medications  Outcome: Progressing  Pt and family updated on plan of care     Problem: Skin Integrity  Goal: Skin integrity is maintained or improved  Outcome: Progressing  Pt assisted in turning, provided with pillows for offloading. Very painful for pt to move in bed and be turned     Problem: Respiratory  Goal: Patient will achieve/maintain optimum respiratory ventilation and gas exchange  Outcome: Not Met   The patient is on 3 L NC, shallow breathing, tachypnea. Encouraged to perform IS. Poor performance.    Shift Goals  Clinical Goals: IS, pain control, mobility  Patient Goals: decreased pain, find jacket  Family Goals: find jacket, updates    Progress made toward(s) clinical / shift goals:  pt encouraged to do IS    Patient is not progressing towards the following goals: pain med interval increased per orders

## 2022-04-11 NOTE — DISCHARGE PLANNING
Care Transition Team Assessment     LMSW met with pt at bedside and obtained the following information used in this assessment. Merge chart MRN: [id number].     Patient is a single 47 year old male who lives alone in [place], [place]. Prior to current hospitalization, pt was completely independent in ADLS/IADLS. No prior services. Patient is employed and has [employer] insurance. Merge chart shows card information. (ID No.: [id number]) PFA to upload and confirm coverage is active. Pharmacy is [facility].     Plan: Continue to assist with social/dc needs     Care Transition Team Assessment    Information Source  Orientation Level: Oriented X4  Information Given By: Patient  Who is responsible for making decisions for patient? : Patient    Readmission Evaluation  Is this a readmission?: No    Elopement Risk  Legal Hold: No  Ambulatory or Self Mobile in Wheelchair: No-Not an Elopement Risk  Elopement Risk: Not at Risk for Elopement    Interdisciplinary Discharge Planning  Patient or legal guardian wants to designate a caregiver: No    Discharge Preparedness  What is your plan after discharge?: Home with help  What are your discharge supports?:  (Friends)  Prior Functional Level: Ambulatory,Independent with Activities of Daily Living,Independent with Medication Management    Functional Assesment  Prior Functional Level: Ambulatory,Independent with Activities of Daily Living,Independent with Medication Management    Finances  Financial Barriers to Discharge: No  Prescription Coverage: Yes    Vision / Hearing Impairment  Vision Impairment : Yes  Right Eye Vision: Wears Glasses  Left Eye Vision: Wears Glasses  Hearing Impairment : No    Advance Directive  Advance Directive?: None  Advance Directive offered?: AD Booklet refused    Domestic Abuse  Have you ever been the victim of abuse or violence?: No  Physical Abuse or Sexual Abuse: No  Verbal Abuse or Emotional Abuse: No  Possible Abuse/Neglect Reported to:: Not  Applicable    Psychological Assessment  History of Substance Abuse: None  History of Psychiatric Problems: No  Non-compliant with Treatment: No  Newly Diagnosed Illness: No    Discharge Risks or Barriers  Discharge risks or barriers?: Post-acute placement / services,Complex medical needs  Patient risk factors: Complex medical needs    Anticipated Discharge Information  Discharge Disposition: Discharged to home/self care

## 2022-04-11 NOTE — CARE PLAN
The patient is Watcher - Medium risk of patient condition declining or worsening    Shift Goals  Clinical Goals: IS, pain control, mobility  Patient Goals: pain control  Family Goals: no family present    Progress made toward(s) clinical / shift goals:  yes    Problem: Pain - Standard  Goal: Alleviation of pain or a reduction in pain to the patient’s comfort goal  Outcome: Progressing     Problem: Knowledge Deficit - Standard  Goal: Patient and family/care givers will demonstrate understanding of plan of care, disease process/condition, diagnostic tests and medications  Outcome: Progressing     Problem: Skin Integrity  Goal: Skin integrity is maintained or improved  Outcome: Progressing     Problem: Fall Risk  Goal: Patient will remain free from falls  Outcome: Progressing     Problem: Respiratory  Goal: Patient will achieve/maintain optimum respiratory ventilation and gas exchange  Outcome: Progressing

## 2022-04-12 ENCOUNTER — APPOINTMENT (OUTPATIENT)
Dept: RADIOLOGY | Facility: MEDICAL CENTER | Age: 47
DRG: 199 | End: 2022-04-12
Attending: SURGERY
Payer: COMMERCIAL

## 2022-04-12 LAB
ALBUMIN SERPL BCP-MCNC: 3.6 G/DL (ref 3.2–4.9)
ALBUMIN/GLOB SERPL: 1.1 G/DL
ALP SERPL-CCNC: 105 U/L (ref 30–99)
ALT SERPL-CCNC: 36 U/L (ref 2–50)
ANION GAP SERPL CALC-SCNC: 9 MMOL/L (ref 7–16)
AST SERPL-CCNC: 44 U/L (ref 12–45)
BASOPHILS # BLD AUTO: 0.2 % (ref 0–1.8)
BASOPHILS # BLD: 0.02 K/UL (ref 0–0.12)
BILIRUB SERPL-MCNC: 0.5 MG/DL (ref 0.1–1.5)
BUN SERPL-MCNC: 14 MG/DL (ref 8–22)
CALCIUM SERPL-MCNC: 8.3 MG/DL (ref 8.5–10.5)
CHLORIDE SERPL-SCNC: 102 MMOL/L (ref 96–112)
CO2 SERPL-SCNC: 25 MMOL/L (ref 20–33)
CREAT SERPL-MCNC: 0.85 MG/DL (ref 0.5–1.4)
EOSINOPHIL # BLD AUTO: 0.16 K/UL (ref 0–0.51)
EOSINOPHIL NFR BLD: 1.9 % (ref 0–6.9)
ERYTHROCYTE [DISTWIDTH] IN BLOOD BY AUTOMATED COUNT: 39.1 FL (ref 35.9–50)
GFR SERPLBLD CREATININE-BSD FMLA CKD-EPI: 109 ML/MIN/1.73 M 2
GLOBULIN SER CALC-MCNC: 3.3 G/DL (ref 1.9–3.5)
GLUCOSE BLD STRIP.AUTO-MCNC: 111 MG/DL (ref 65–99)
GLUCOSE BLD STRIP.AUTO-MCNC: 116 MG/DL (ref 65–99)
GLUCOSE BLD STRIP.AUTO-MCNC: 91 MG/DL (ref 65–99)
GLUCOSE SERPL-MCNC: 102 MG/DL (ref 65–99)
HCT VFR BLD AUTO: 39.6 % (ref 42–52)
HGB BLD-MCNC: 13.1 G/DL (ref 14–18)
IMM GRANULOCYTES # BLD AUTO: 0.03 K/UL (ref 0–0.11)
IMM GRANULOCYTES NFR BLD AUTO: 0.3 % (ref 0–0.9)
LYMPHOCYTES # BLD AUTO: 1.58 K/UL (ref 1–4.8)
LYMPHOCYTES NFR BLD: 18.3 % (ref 22–41)
MAGNESIUM SERPL-MCNC: 2.2 MG/DL (ref 1.5–2.5)
MCH RBC QN AUTO: 27.8 PG (ref 27–33)
MCHC RBC AUTO-ENTMCNC: 33.1 G/DL (ref 33.7–35.3)
MCV RBC AUTO: 83.9 FL (ref 81.4–97.8)
MONOCYTES # BLD AUTO: 0.97 K/UL (ref 0–0.85)
MONOCYTES NFR BLD AUTO: 11.3 % (ref 0–13.4)
NEUTROPHILS # BLD AUTO: 5.86 K/UL (ref 1.82–7.42)
NEUTROPHILS NFR BLD: 68 % (ref 44–72)
NRBC # BLD AUTO: 0 K/UL
NRBC BLD-RTO: 0 /100 WBC
PHOSPHATE SERPL-MCNC: 2.9 MG/DL (ref 2.5–4.5)
PLATELET # BLD AUTO: 267 K/UL (ref 164–446)
PMV BLD AUTO: 10.2 FL (ref 9–12.9)
POTASSIUM SERPL-SCNC: 4.4 MMOL/L (ref 3.6–5.5)
PROT SERPL-MCNC: 6.9 G/DL (ref 6–8.2)
RBC # BLD AUTO: 4.72 M/UL (ref 4.7–6.1)
SODIUM SERPL-SCNC: 136 MMOL/L (ref 135–145)
WBC # BLD AUTO: 8.6 K/UL (ref 4.8–10.8)

## 2022-04-12 PROCEDURE — 85025 COMPLETE CBC W/AUTO DIFF WBC: CPT

## 2022-04-12 PROCEDURE — 700101 HCHG RX REV CODE 250: Performed by: SURGERY

## 2022-04-12 PROCEDURE — A9270 NON-COVERED ITEM OR SERVICE: HCPCS | Performed by: SURGERY

## 2022-04-12 PROCEDURE — 700102 HCHG RX REV CODE 250 W/ 637 OVERRIDE(OP): Performed by: SURGERY

## 2022-04-12 PROCEDURE — 99233 SBSQ HOSP IP/OBS HIGH 50: CPT | Mod: FS | Performed by: PHYSICIAN ASSISTANT

## 2022-04-12 PROCEDURE — 84100 ASSAY OF PHOSPHORUS: CPT

## 2022-04-12 PROCEDURE — A9270 NON-COVERED ITEM OR SERVICE: HCPCS | Performed by: NURSE PRACTITIONER

## 2022-04-12 PROCEDURE — 97530 THERAPEUTIC ACTIVITIES: CPT

## 2022-04-12 PROCEDURE — 80053 COMPREHEN METABOLIC PANEL: CPT

## 2022-04-12 PROCEDURE — 71045 X-RAY EXAM CHEST 1 VIEW: CPT

## 2022-04-12 PROCEDURE — 99254 IP/OBS CNSLTJ NEW/EST MOD 60: CPT | Performed by: PHYSICAL MEDICINE & REHABILITATION

## 2022-04-12 PROCEDURE — 82962 GLUCOSE BLOOD TEST: CPT | Mod: 91

## 2022-04-12 PROCEDURE — 700111 HCHG RX REV CODE 636 W/ 250 OVERRIDE (IP): Performed by: SURGERY

## 2022-04-12 PROCEDURE — 94669 MECHANICAL CHEST WALL OSCILL: CPT

## 2022-04-12 PROCEDURE — 83735 ASSAY OF MAGNESIUM: CPT

## 2022-04-12 PROCEDURE — 770001 HCHG ROOM/CARE - MED/SURG/GYN PRIV*

## 2022-04-12 PROCEDURE — 700102 HCHG RX REV CODE 250 W/ 637 OVERRIDE(OP): Performed by: NURSE PRACTITIONER

## 2022-04-12 RX ADMIN — MORPHINE SULFATE 15 MG: 15 TABLET, FILM COATED, EXTENDED RELEASE ORAL at 21:17

## 2022-04-12 RX ADMIN — OXYCODONE HYDROCHLORIDE 10 MG: 10 TABLET ORAL at 10:46

## 2022-04-12 RX ADMIN — MORPHINE SULFATE 15 MG: 15 TABLET, FILM COATED, EXTENDED RELEASE ORAL at 09:01

## 2022-04-12 RX ADMIN — ENOXAPARIN SODIUM 40 MG: 40 INJECTION SUBCUTANEOUS at 05:51

## 2022-04-12 RX ADMIN — CELECOXIB 200 MG: 200 CAPSULE ORAL at 17:29

## 2022-04-12 RX ADMIN — POLYETHYLENE GLYCOL 3350 1 PACKET: 17 POWDER, FOR SOLUTION ORAL at 17:29

## 2022-04-12 RX ADMIN — FAMOTIDINE 20 MG: 20 TABLET ORAL at 17:29

## 2022-04-12 RX ADMIN — GABAPENTIN 100 MG: 100 CAPSULE ORAL at 14:04

## 2022-04-12 RX ADMIN — METAXALONE 800 MG: 800 TABLET ORAL at 17:29

## 2022-04-12 RX ADMIN — SENNOSIDES AND DOCUSATE SODIUM 1 TABLET: 50; 8.6 TABLET ORAL at 21:17

## 2022-04-12 RX ADMIN — ACETAMINOPHEN 1000 MG: 500 TABLET ORAL at 05:51

## 2022-04-12 RX ADMIN — GABAPENTIN 100 MG: 100 CAPSULE ORAL at 05:51

## 2022-04-12 RX ADMIN — DOCUSATE SODIUM 100 MG: 100 CAPSULE, LIQUID FILLED ORAL at 17:29

## 2022-04-12 RX ADMIN — CELECOXIB 200 MG: 200 CAPSULE ORAL at 05:51

## 2022-04-12 RX ADMIN — ENOXAPARIN SODIUM 40 MG: 40 INJECTION SUBCUTANEOUS at 17:29

## 2022-04-12 RX ADMIN — FAMOTIDINE 20 MG: 20 TABLET ORAL at 05:51

## 2022-04-12 RX ADMIN — DOCUSATE SODIUM 100 MG: 100 CAPSULE, LIQUID FILLED ORAL at 05:51

## 2022-04-12 RX ADMIN — METAXALONE 800 MG: 800 TABLET ORAL at 11:28

## 2022-04-12 RX ADMIN — LIDOCAINE 3 PATCH: 50 PATCH TOPICAL at 05:52

## 2022-04-12 RX ADMIN — ACETAMINOPHEN 1000 MG: 500 TABLET ORAL at 17:28

## 2022-04-12 RX ADMIN — METAXALONE 800 MG: 800 TABLET ORAL at 05:51

## 2022-04-12 RX ADMIN — MAGNESIUM HYDROXIDE 30 ML: 400 SUSPENSION ORAL at 05:51

## 2022-04-12 RX ADMIN — POLYETHYLENE GLYCOL 3350 1 PACKET: 17 POWDER, FOR SOLUTION ORAL at 05:51

## 2022-04-12 RX ADMIN — ONDANSETRON 4 MG: 2 INJECTION INTRAMUSCULAR; INTRAVENOUS at 19:26

## 2022-04-12 RX ADMIN — GABAPENTIN 100 MG: 100 CAPSULE ORAL at 21:17

## 2022-04-12 RX ADMIN — ACETAMINOPHEN 1000 MG: 500 TABLET ORAL at 11:28

## 2022-04-12 ASSESSMENT — ENCOUNTER SYMPTOMS
SENSORY CHANGE: 0
TINGLING: 0
ABDOMINAL PAIN: 0
NECK PAIN: 0
WEAKNESS: 0
FEVER: 0
SHORTNESS OF BREATH: 0
CHILLS: 0
NAUSEA: 0
BACK PAIN: 0
COUGH: 1
VOMITING: 0
MYALGIAS: 1

## 2022-04-12 ASSESSMENT — COGNITIVE AND FUNCTIONAL STATUS - GENERAL
MOVING FROM LYING ON BACK TO SITTING ON SIDE OF FLAT BED: UNABLE
DRESSING REGULAR UPPER BODY CLOTHING: A LOT
WALKING IN HOSPITAL ROOM: A LOT
TURNING FROM BACK TO SIDE WHILE IN FLAT BAD: A LOT
SUGGESTED CMS G CODE MODIFIER DAILY ACTIVITY: CK
DAILY ACTIVITIY SCORE: 14
PERSONAL GROOMING: A LITTLE
STANDING UP FROM CHAIR USING ARMS: A LOT
HELP NEEDED FOR BATHING: A LOT
EATING MEALS: A LITTLE
SUGGESTED CMS G CODE MODIFIER MOBILITY: CL
CLIMB 3 TO 5 STEPS WITH RAILING: A LOT
MOVING TO AND FROM BED TO CHAIR: UNABLE
DRESSING REGULAR LOWER BODY CLOTHING: A LOT
MOBILITY SCORE: 10
TOILETING: A LOT

## 2022-04-12 ASSESSMENT — GAIT ASSESSMENTS
DISTANCE (FEET): 5
GAIT LEVEL OF ASSIST: MINIMAL ASSIST
DEVIATION: BRADYKINETIC
ASSISTIVE DEVICE: HAND HELD ASSIST

## 2022-04-12 ASSESSMENT — PAIN DESCRIPTION - PAIN TYPE
TYPE: ACUTE PAIN

## 2022-04-12 NOTE — CARE PLAN
Problem: Pain - Standard  Goal: Alleviation of pain or a reduction in pain to the patient’s comfort goal  Outcome: Progressing     Problem: Knowledge Deficit - Standard  Goal: Patient and family/care givers will demonstrate understanding of plan of care, disease process/condition, diagnostic tests and medications  Outcome: Progressing     Problem: Skin Integrity  Goal: Skin integrity is maintained or improved  Outcome: Progressing     Problem: Fall Risk  Goal: Patient will remain free from falls  Outcome: Progressing     Problem: Respiratory  Goal: Patient will achieve/maintain optimum respiratory ventilation and gas exchange  Outcome: Progressing   The patient is Watcher - Medium risk of patient condition declining or worsening    Shift Goals  Clinical Goals: IS, pain control, mobility  Patient Goals: pain control  Family Goals: no family present

## 2022-04-12 NOTE — DISCHARGE PLANNING
LMSW spoke with Dr. Ruiz and Dr. Jose regarding anticiapted dc plan/needs. PMR consult to be placed to assist.
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
## 2022-04-12 NOTE — CARE PLAN
Problem: Pain - Standard  Goal: Alleviation of pain or a reduction in pain to the patient’s comfort goal  Outcome: Progressing     Problem: Knowledge Deficit - Standard  Goal: Patient and family/care givers will demonstrate understanding of plan of care, disease process/condition, diagnostic tests and medications  Outcome: Progressing     Problem: Skin Integrity  Goal: Skin integrity is maintained or improved  Outcome: Progressing     Problem: Fall Risk  Goal: Patient will remain free from falls  Outcome: Progressing   The patient is Stable - Low risk of patient condition declining or worsening    Shift Goals  Clinical Goals: IS, pain control, rest  Patient Goals: pain control  Family Goals: no family present    Progress made toward(s) clinical / shift goals:  Patient pain is adequately managed, patient knowledge of care plan and medical equipment is improving, patient skin integrity is maintained and healing, patient has remained free of falls during hospitalization.    Patient is not progressing towards the following goals:
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
## 2022-04-12 NOTE — CARE PLAN
Problem: Hyperinflation  Goal: Prevent or improve atelectasis  Description: Target End Date:  3 to 4 days    1. Instruct incentive spirometry usage  2.  Perform hyperinflation therapy as indicated  Outcome: Progressing  Flowsheets (Taken 4/10/2022 1959 by Nathan T. Cowden, RRT)  Hyperinflation Protocol Goals/Outcome: Improvement in Repeat CXR  Hyperinflation Protocol Indications: Chest Trauma (Blunt, Penetrative, Crushing, or Surgical)       Respiratory Update    Treatment modality: PEP  Frequency: QID    IS: 1000    Pt tolerating current treatments well with no adverse reactions.

## 2022-04-12 NOTE — THERAPY
Physical Therapy   Daily Treatment     Patient Name: Paco Overton  Age:  47 y.o., Sex:  male  Medical Record #: [id number]  Today's Date: 4/12/2022     Precautions  Precautions: Fall Risk;Non Weight Bearing Left Upper Extremity  Comments: sling L UE for comfort    Assessment    Expected patient to make more progress than he did from yesterday. Reported pain more controlled, however not receptive to all training focused at decreasing pain. Attempted to teach patient how to adjust sling and perform log roll technique to decrease pain with bed mobility, however patient declined to follow this teaching. Limited agreement to mobility, only agreeable to ambulate short distance to chair. Educated on importance of mobility, pt demonstrated understanding.    Plan    Continue current treatment plan.    DC Equipment Recommendations: Unable to determine at this time  Discharge Recommendations: Recommend post-acute placement for additional physical therapy services prior to discharge home           Objective       04/12/22 0859   Cognition    Level of Consciousness Alert   Balance   Sitting Balance (Static) Fair   Sitting Balance (Dynamic) Fair -   Standing Balance (Static) Fair -   Standing Balance (Dynamic) Fair -   Weight Shift Sitting Fair   Weight Shift Standing Fair   Skilled Intervention Verbal Cuing;Tactile Cuing;Sequencing   Comments HHA   Gait Analysis   Gait Level Of Assist Minimal Assist   Assistive Device Hand Held Assist   Distance (Feet) 5   # of Times Distance was Traveled   (1)   Deviation Bradykinetic   Weight Bearing Status NWB L UE   Skilled Intervention Verbal Cuing;Tactile Cuing;Sequencing   Bed Mobility    Supine to Sit Moderate Assist   Sit to Supine   (up in chair)   Skilled Intervention Verbal Cuing;Sequencing;Tactile Cuing   Functional Mobility   Sit to Stand Moderate Assist   Bed, Chair, Wheelchair Transfer Moderate Assist   Skilled Intervention Verbal Cuing;Tactile Cuing;Sequencing   ICU Target Mobility  Level   ICU Mobility - Targeted Level Level 3B   How much difficulty does the patient currently have...   Turning over in bed (including adjusting bedclothes, sheets and blankets)? 2   Sitting down on and standing up from a chair with arms (e.g., wheelchair, bedside commode, etc.) 1   Moving from lying on back to sitting on the side of the bed? 1   How much help from another person does the patient currently need...   Moving to and from a bed to a chair (including a wheelchair)? 2   Need to walk in a hospital room? 2   Climbing 3-5 steps with a railing? 2   6 clicks Mobility Score 10   Short Term Goals    Short Term Goal # 1 Pt will perform supine <> sit with SPV in 6 visits to get in/out of bed   Goal Outcome # 1 goal not met   Short Term Goal # 2 Pt will perform functional transfers with SPV in 6 visits to increase indpeendence   Goal Outcome # 2 Goal not met   Short Term Goal # 3 Pt will ambulate 150ft with no AD and SPV in 6 visits to increase independence   Goal Outcome # 3 Goal not met

## 2022-04-12 NOTE — THERAPY
Occupational Therapy  Daily Treatment     Patient Name: Paco Overton  Age:  47 y.o., Sex:  male  Medical Record #: [id number]  Today's Date: 4/12/2022     Precautions  Precautions: Fall Risk  Comments: sling for comfort L UE    Assessment    Pt currently limited by decreased functional mobility, activity tolerance, ROM, strength, balance, and pain which are affecting pt's ability to complete ADLs/IADLs at baseline. Pt would benefit from OT services in the acute care setting to maximize functional recovery.      Plan    Continue current treatment plan.       Discharge Recommendations: (P) Recommend post-acute placement for additional occupational therapy services prior to discharge home         04/12/22 0846   Activities of Daily Living   Grooming Supervision   Upper Body Dressing Minimal Assist   Lower Body Dressing Moderate Assist   Toileting Moderate Assist   Functional Mobility   Sit to Stand Moderate Assist   Bed, Chair, Wheelchair Transfer Moderate Assist   Short Term Goals   Short Term Goal # 1 supervised with UB dressing   Goal Outcome # 1 Progressing as expected   Short Term Goal # 2 supervised with LB dressing   Goal Outcome # 2 Progressing slower than expected   Anticipated Discharge Equipment and Recommendations   Discharge Recommendations Recommend post-acute placement for additional occupational therapy services prior to discharge home

## 2022-04-12 NOTE — DISCHARGE PLANNING
[facility] Rehabilitation Transitional Care Coordination    Referral from:  Dr. Ruiz per Dr. Jose  Insurance Provider on Facesheet: potentially HTH   Potential Rehab Diagnosis: Multiple fracture     Chart review indicates patient has on going medical management and  therapy needs to possibly meet inpatient rehab facility criteria with the goal of returning to community.    D/C support: Lives alone      Physiatry consultation per protocol.     Last Covid test:     Results for MEL SARMIENTO (MRN [id number]) as of 4/12/2022 11:34   Ref. Range 4/10/2022 15:28   Influenza virus A RNA Latest Ref Range: Negative  Negative   Influenza virus B, PCR Latest Ref Range: Negative  Negative   RSV, PCR Latest Ref Range: Negative  Negative   SARS-CoV-2 by PCR Unknown NotDetected   SARS-CoV-2 Source Unknown NP Swab     Thank you for the referral.

## 2022-04-13 ENCOUNTER — APPOINTMENT (OUTPATIENT)
Dept: RADIOLOGY | Facility: MEDICAL CENTER | Age: 47
DRG: 199 | End: 2022-04-13
Attending: SURGERY
Payer: COMMERCIAL

## 2022-04-13 LAB
GLUCOSE BLD STRIP.AUTO-MCNC: 95 MG/DL (ref 65–99)
PHOSPHATE SERPL-MCNC: 2.9 MG/DL (ref 2.5–4.5)

## 2022-04-13 PROCEDURE — A9270 NON-COVERED ITEM OR SERVICE: HCPCS | Performed by: SURGERY

## 2022-04-13 PROCEDURE — 82962 GLUCOSE BLOOD TEST: CPT

## 2022-04-13 PROCEDURE — 94669 MECHANICAL CHEST WALL OSCILL: CPT

## 2022-04-13 PROCEDURE — 700101 HCHG RX REV CODE 250: Performed by: SURGERY

## 2022-04-13 PROCEDURE — 700111 HCHG RX REV CODE 636 W/ 250 OVERRIDE (IP): Performed by: SURGERY

## 2022-04-13 PROCEDURE — 700102 HCHG RX REV CODE 250 W/ 637 OVERRIDE(OP)

## 2022-04-13 PROCEDURE — A9270 NON-COVERED ITEM OR SERVICE: HCPCS | Performed by: NURSE PRACTITIONER

## 2022-04-13 PROCEDURE — A9270 NON-COVERED ITEM OR SERVICE: HCPCS

## 2022-04-13 PROCEDURE — 700102 HCHG RX REV CODE 250 W/ 637 OVERRIDE(OP): Performed by: NURSE PRACTITIONER

## 2022-04-13 PROCEDURE — 99233 SBSQ HOSP IP/OBS HIGH 50: CPT | Mod: FS

## 2022-04-13 PROCEDURE — 770001 HCHG ROOM/CARE - MED/SURG/GYN PRIV*

## 2022-04-13 PROCEDURE — 84100 ASSAY OF PHOSPHORUS: CPT

## 2022-04-13 PROCEDURE — 700102 HCHG RX REV CODE 250 W/ 637 OVERRIDE(OP): Performed by: SURGERY

## 2022-04-13 PROCEDURE — 71045 X-RAY EXAM CHEST 1 VIEW: CPT

## 2022-04-13 RX ORDER — OXYCODONE HYDROCHLORIDE 5 MG/1
5 TABLET ORAL EVERY 4 HOURS PRN
Status: DISCONTINUED | OUTPATIENT
Start: 2022-04-13 | End: 2022-04-14 | Stop reason: HOSPADM

## 2022-04-13 RX ORDER — OXYCODONE HYDROCHLORIDE 10 MG/1
10 TABLET ORAL EVERY 4 HOURS PRN
Status: DISCONTINUED | OUTPATIENT
Start: 2022-04-13 | End: 2022-04-14 | Stop reason: HOSPADM

## 2022-04-13 RX ADMIN — BISACODYL 10 MG: 10 SUPPOSITORY RECTAL at 17:11

## 2022-04-13 RX ADMIN — ACETAMINOPHEN 1000 MG: 500 TABLET ORAL at 23:37

## 2022-04-13 RX ADMIN — ACETAMINOPHEN 1000 MG: 500 TABLET ORAL at 05:38

## 2022-04-13 RX ADMIN — MAGNESIUM CITRATE 296 ML: 1.75 LIQUID ORAL at 10:27

## 2022-04-13 RX ADMIN — ACETAMINOPHEN 1000 MG: 500 TABLET ORAL at 13:51

## 2022-04-13 RX ADMIN — MORPHINE SULFATE 15 MG: 15 TABLET, FILM COATED, EXTENDED RELEASE ORAL at 09:47

## 2022-04-13 RX ADMIN — OXYCODONE HYDROCHLORIDE 10 MG: 10 TABLET ORAL at 01:00

## 2022-04-13 RX ADMIN — LIDOCAINE 3 PATCH: 50 PATCH TOPICAL at 05:39

## 2022-04-13 RX ADMIN — MAGNESIUM HYDROXIDE 30 ML: 400 SUSPENSION ORAL at 05:37

## 2022-04-13 RX ADMIN — FAMOTIDINE 20 MG: 20 TABLET ORAL at 05:38

## 2022-04-13 RX ADMIN — POLYETHYLENE GLYCOL 3350 1 PACKET: 17 POWDER, FOR SOLUTION ORAL at 05:37

## 2022-04-13 RX ADMIN — ONDANSETRON 4 MG: 2 INJECTION INTRAMUSCULAR; INTRAVENOUS at 07:02

## 2022-04-13 RX ADMIN — GABAPENTIN 100 MG: 100 CAPSULE ORAL at 23:37

## 2022-04-13 RX ADMIN — POLYETHYLENE GLYCOL 3350 1 PACKET: 17 POWDER, FOR SOLUTION ORAL at 17:12

## 2022-04-13 RX ADMIN — METAXALONE 800 MG: 800 TABLET ORAL at 17:12

## 2022-04-13 RX ADMIN — METAXALONE 800 MG: 800 TABLET ORAL at 13:50

## 2022-04-13 RX ADMIN — ENOXAPARIN SODIUM 40 MG: 40 INJECTION SUBCUTANEOUS at 17:11

## 2022-04-13 RX ADMIN — CELECOXIB 200 MG: 200 CAPSULE ORAL at 05:38

## 2022-04-13 RX ADMIN — MORPHINE SULFATE 15 MG: 15 TABLET, FILM COATED, EXTENDED RELEASE ORAL at 20:04

## 2022-04-13 RX ADMIN — DOCUSATE SODIUM 100 MG: 100 CAPSULE, LIQUID FILLED ORAL at 05:38

## 2022-04-13 RX ADMIN — METAXALONE 800 MG: 800 TABLET ORAL at 05:39

## 2022-04-13 RX ADMIN — ACETAMINOPHEN 1000 MG: 500 TABLET ORAL at 17:12

## 2022-04-13 RX ADMIN — ENOXAPARIN SODIUM 40 MG: 40 INJECTION SUBCUTANEOUS at 05:38

## 2022-04-13 RX ADMIN — DOCUSATE SODIUM 100 MG: 100 CAPSULE, LIQUID FILLED ORAL at 17:11

## 2022-04-13 RX ADMIN — CELECOXIB 200 MG: 200 CAPSULE ORAL at 17:12

## 2022-04-13 RX ADMIN — GABAPENTIN 100 MG: 100 CAPSULE ORAL at 06:00

## 2022-04-13 RX ADMIN — GABAPENTIN 100 MG: 100 CAPSULE ORAL at 13:50

## 2022-04-13 ASSESSMENT — ENCOUNTER SYMPTOMS
DIZZINESS: 0
CONSTIPATION: 1
NECK PAIN: 0
FOCAL WEAKNESS: 0
COUGH: 1
SENSORY CHANGE: 0
ABDOMINAL PAIN: 0
DOUBLE VISION: 0
SHORTNESS OF BREATH: 0
ROS GI COMMENTS: + FLATUS
HEADACHES: 0
VOMITING: 0
BLURRED VISION: 0
TINGLING: 0
NAUSEA: 0
CHILLS: 0
DIAPHORESIS: 0
BACK PAIN: 0
FEVER: 0
SPEECH CHANGE: 0
MYALGIAS: 1
WEAKNESS: 0
PHOTOPHOBIA: 0

## 2022-04-13 ASSESSMENT — PAIN DESCRIPTION - PAIN TYPE
TYPE: ACUTE PAIN

## 2022-04-13 ASSESSMENT — LIFESTYLE VARIABLES: DOES PATIENT WANT TO STOP DRINKING: NO

## 2022-04-13 NOTE — CARE PLAN
The patient is Stable - Low risk of patient condition declining or worsening    Shift Goals  Clinical Goals: pain control, spO2 >93%  Patient Goals: pain control  Family Goals: no family present    Progress made toward(s) clinical / shift goals:    Problem: Pain - Standard  Goal: Alleviation of pain or a reduction in pain to the patient’s comfort goal  Outcome: Progressing     Problem: Knowledge Deficit - Standard  Goal: Patient and family/care givers will demonstrate understanding of plan of care, disease process/condition, diagnostic tests and medications  Outcome: Progressing     Problem: Fall Risk  Goal: Patient will remain free from falls  Outcome: Progressing     Problem: Respiratory  Goal: Patient will achieve/maintain optimum respiratory ventilation and gas exchange  Outcome: Progressing

## 2022-04-13 NOTE — CARE PLAN
Problem: Hyperinflation  Goal: Prevent or improve atelectasis  Description: Target End Date:  3 to 4 days    1. Instruct incentive spirometry usage  2.  Perform hyperinflation therapy as indicated  Outcome: Progressing  Flowsheets  Hyperinflation Protocol Goals/Outcome: Stable Vital Capacity x24 hrs and Patient Understands / uses I.S.  Hyperinflation Protocol Indications: Chest Trauma (Blunt, Penetrative, Crushing, or Surgical)       Respiratory Update    Treatment modality: PEP  Frequency: QID    IS: 1500    Pt tolerating current treatments well with no adverse reactions.
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
## 2022-04-13 NOTE — CARE PLAN
Problem: Pain - Standard  Goal: Alleviation of pain or a reduction in pain to the patient’s comfort goal  Outcome: Progressing     Problem: Knowledge Deficit - Standard  Goal: Patient and family/care givers will demonstrate understanding of plan of care, disease process/condition, diagnostic tests and medications  Outcome: Progressing     Problem: Skin Integrity  Goal: Skin integrity is maintained or improved  Outcome: Progressing     Problem: Fall Risk  Goal: Patient will remain free from falls  Outcome: Progressing     Problem: Respiratory  Goal: Patient will achieve/maintain optimum respiratory ventilation and gas exchange  Outcome: Progressing   The patient is Watcher - Medium risk of patient condition declining or worsening    Shift Goals  Clinical Goals: IS, pain control, rest  Patient Goals: pain control  Family Goals: no family present

## 2022-04-13 NOTE — DISCHARGE PLANNING
Reviewed Dr. Jose's consult. Anticipate dc to pt's parents home which is a [place]-story home in [place] [facility] [place]. No additional needs.

## 2022-04-14 ENCOUNTER — APPOINTMENT (OUTPATIENT)
Dept: RADIOLOGY | Facility: MEDICAL CENTER | Age: 47
DRG: 199 | End: 2022-04-14
Attending: SURGERY
Payer: COMMERCIAL

## 2022-04-14 VITALS
DIASTOLIC BLOOD PRESSURE: 110 MMHG | BODY MASS INDEX: 26.55 KG/M2 | HEART RATE: 96 BPM | HEIGHT: 72 IN | OXYGEN SATURATION: 94 % | WEIGHT: 195.99 LBS | SYSTOLIC BLOOD PRESSURE: 147 MMHG | RESPIRATION RATE: 16 BRPM | TEMPERATURE: 97.7 F

## 2022-04-14 PROBLEM — Z11.52 ENCOUNTER FOR SCREENING LABORATORY TESTING FOR COVID-19 VIRUS: Status: RESOLVED | Noted: 2022-04-10 | Resolved: 2022-04-14

## 2022-04-14 PROBLEM — Z53.09 CONTRAINDICATION TO DEEP VEIN THROMBOSIS (DVT) PROPHYLAXIS: Status: RESOLVED | Noted: 2022-04-10 | Resolved: 2022-04-14

## 2022-04-14 PROCEDURE — A9270 NON-COVERED ITEM OR SERVICE: HCPCS | Performed by: SURGERY

## 2022-04-14 PROCEDURE — 700101 HCHG RX REV CODE 250: Performed by: SURGERY

## 2022-04-14 PROCEDURE — 97530 THERAPEUTIC ACTIVITIES: CPT

## 2022-04-14 PROCEDURE — 97116 GAIT TRAINING THERAPY: CPT

## 2022-04-14 PROCEDURE — 51798 US URINE CAPACITY MEASURE: CPT

## 2022-04-14 PROCEDURE — 700102 HCHG RX REV CODE 250 W/ 637 OVERRIDE(OP): Performed by: SURGERY

## 2022-04-14 PROCEDURE — 700111 HCHG RX REV CODE 636 W/ 250 OVERRIDE (IP): Performed by: SURGERY

## 2022-04-14 PROCEDURE — 71045 X-RAY EXAM CHEST 1 VIEW: CPT

## 2022-04-14 PROCEDURE — 94669 MECHANICAL CHEST WALL OSCILL: CPT

## 2022-04-14 PROCEDURE — 99232 SBSQ HOSP IP/OBS MODERATE 35: CPT | Mod: FS

## 2022-04-14 RX ORDER — ECHINACEA PURPUREA EXTRACT 125 MG
2 TABLET ORAL
Refills: 3 | COMMUNITY
Start: 2022-04-14

## 2022-04-14 RX ORDER — POLYETHYLENE GLYCOL 3350 17 G/17G
17 POWDER, FOR SOLUTION ORAL DAILY
COMMUNITY
Start: 2022-04-14

## 2022-04-14 RX ORDER — ACETAMINOPHEN 500 MG
1000 TABLET ORAL EVERY 6 HOURS PRN
Qty: 30 TABLET | Refills: 0 | COMMUNITY
Start: 2022-04-15

## 2022-04-14 RX ORDER — AMOXICILLIN 250 MG
1 CAPSULE ORAL
Qty: 30 TABLET | Refills: 0 | COMMUNITY
Start: 2022-04-14

## 2022-04-14 RX ORDER — PSEUDOEPHEDRINE HCL 30 MG
100 TABLET ORAL 2 TIMES DAILY
Qty: 60 CAPSULE | COMMUNITY
Start: 2022-04-14

## 2022-04-14 RX ORDER — GABAPENTIN 100 MG/1
100 CAPSULE ORAL EVERY 8 HOURS
Qty: 21 CAPSULE | Refills: 0 | Status: SHIPPED | OUTPATIENT
Start: 2022-04-14 | End: 2022-04-21

## 2022-04-14 RX ORDER — METAXALONE 800 MG/1
800 TABLET ORAL 3 TIMES DAILY
Qty: 21 TABLET | Refills: 0 | Status: SHIPPED | OUTPATIENT
Start: 2022-04-14 | End: 2022-04-14

## 2022-04-14 RX ORDER — MORPHINE SULFATE 15 MG/1
15 TABLET, FILM COATED, EXTENDED RELEASE ORAL EVERY 12 HOURS
Qty: 14 TABLET | Refills: 0 | Status: SHIPPED | OUTPATIENT
Start: 2022-04-14 | End: 2022-04-14

## 2022-04-14 RX ORDER — ECHINACEA PURPUREA EXTRACT 125 MG
2 TABLET ORAL
Status: DISCONTINUED | OUTPATIENT
Start: 2022-04-14 | End: 2022-04-14 | Stop reason: HOSPADM

## 2022-04-14 RX ORDER — OXYCODONE HYDROCHLORIDE 5 MG/1
5 TABLET ORAL EVERY 8 HOURS PRN
Qty: 20 TABLET | Refills: 0 | Status: SHIPPED | OUTPATIENT
Start: 2022-04-14 | End: 2022-04-21

## 2022-04-14 RX ORDER — GABAPENTIN 100 MG/1
100 CAPSULE ORAL EVERY 8 HOURS
Qty: 21 CAPSULE | Refills: 0 | Status: SHIPPED | OUTPATIENT
Start: 2022-04-14 | End: 2022-04-14

## 2022-04-14 RX ORDER — ACETAMINOPHEN 500 MG
1000 TABLET ORAL EVERY 6 HOURS PRN
Qty: 30 TABLET | Refills: 0 | COMMUNITY
Start: 2022-04-15 | End: 2022-04-14

## 2022-04-14 RX ORDER — CELECOXIB 200 MG/1
200 CAPSULE ORAL 2 TIMES DAILY
Qty: 14 CAPSULE | Refills: 0 | Status: SHIPPED | OUTPATIENT
Start: 2022-04-14 | End: 2022-04-14

## 2022-04-14 RX ORDER — LIDOCAINE 50 MG/G
3 PATCH TOPICAL EVERY 24 HOURS
Qty: 21 PATCH | Refills: 0 | Status: SHIPPED | OUTPATIENT
Start: 2022-04-15 | End: 2022-04-22

## 2022-04-14 RX ORDER — METAXALONE 800 MG/1
800 TABLET ORAL 3 TIMES DAILY
Qty: 21 TABLET | Refills: 0 | Status: SHIPPED | OUTPATIENT
Start: 2022-04-14 | End: 2022-04-21

## 2022-04-14 RX ORDER — LIDOCAINE 50 MG/G
3 PATCH TOPICAL EVERY 24 HOURS
Qty: 21 PATCH | Refills: 0 | Status: SHIPPED | OUTPATIENT
Start: 2022-04-15 | End: 2022-04-14

## 2022-04-14 RX ORDER — CELECOXIB 200 MG/1
200 CAPSULE ORAL 2 TIMES DAILY
Qty: 14 CAPSULE | Refills: 0 | Status: SHIPPED | OUTPATIENT
Start: 2022-04-14 | End: 2022-04-21

## 2022-04-14 RX ORDER — OXYCODONE HYDROCHLORIDE 5 MG/1
5 TABLET ORAL EVERY 8 HOURS PRN
Qty: 20 TABLET | Refills: 0 | Status: SHIPPED | OUTPATIENT
Start: 2022-04-14 | End: 2022-04-14

## 2022-04-14 RX ORDER — MORPHINE SULFATE 15 MG/1
15 TABLET, FILM COATED, EXTENDED RELEASE ORAL EVERY 12 HOURS
Qty: 14 TABLET | Refills: 0 | Status: SHIPPED | OUTPATIENT
Start: 2022-04-14 | End: 2022-04-21

## 2022-04-14 RX ADMIN — LIDOCAINE 3 PATCH: 50 PATCH TOPICAL at 05:24

## 2022-04-14 RX ADMIN — ACETAMINOPHEN 1000 MG: 500 TABLET ORAL at 05:23

## 2022-04-14 RX ADMIN — ENOXAPARIN SODIUM 40 MG: 40 INJECTION SUBCUTANEOUS at 05:23

## 2022-04-14 RX ADMIN — METAXALONE 800 MG: 800 TABLET ORAL at 12:49

## 2022-04-14 RX ADMIN — CELECOXIB 200 MG: 200 CAPSULE ORAL at 05:23

## 2022-04-14 RX ADMIN — GABAPENTIN 100 MG: 100 CAPSULE ORAL at 12:50

## 2022-04-14 RX ADMIN — GABAPENTIN 100 MG: 100 CAPSULE ORAL at 05:23

## 2022-04-14 RX ADMIN — MORPHINE SULFATE 15 MG: 15 TABLET, FILM COATED, EXTENDED RELEASE ORAL at 08:27

## 2022-04-14 RX ADMIN — METAXALONE 800 MG: 800 TABLET ORAL at 05:23

## 2022-04-14 ASSESSMENT — PAIN DESCRIPTION - PAIN TYPE
TYPE: ACUTE PAIN

## 2022-04-14 ASSESSMENT — ENCOUNTER SYMPTOMS
ROS GI COMMENTS: LAST BM 4/13
NAUSEA: 0
FEVER: 0
ABDOMINAL PAIN: 0
PALPITATIONS: 0
MYALGIAS: 1
CONSTIPATION: 0
EYES NEGATIVE: 1
COUGH: 1
NEUROLOGICAL NEGATIVE: 1
CHILLS: 0
SHORTNESS OF BREATH: 0

## 2022-04-14 ASSESSMENT — COGNITIVE AND FUNCTIONAL STATUS - GENERAL
MOVING FROM LYING ON BACK TO SITTING ON SIDE OF FLAT BED: A LITTLE
WALKING IN HOSPITAL ROOM: A LITTLE
MOBILITY SCORE: 18
STANDING UP FROM CHAIR USING ARMS: A LITTLE
CLIMB 3 TO 5 STEPS WITH RAILING: A LITTLE
TURNING FROM BACK TO SIDE WHILE IN FLAT BAD: A LITTLE
MOVING TO AND FROM BED TO CHAIR: A LITTLE
SUGGESTED CMS G CODE MODIFIER MOBILITY: CK

## 2022-04-14 ASSESSMENT — GAIT ASSESSMENTS
DISTANCE (FEET): 155
GAIT LEVEL OF ASSIST: SUPERVISED
DEVIATION: BRADYKINETIC

## 2022-04-14 ASSESSMENT — LIFESTYLE VARIABLES: DOES PATIENT WANT TO STOP DRINKING: NO
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
## 2022-04-14 NOTE — DISCHARGE PLANNING
Agency/Facility Name: [facility]   Spoke To: Alejandra   Outcome: DPA called confirming patients home address and discussion about patients prescription. Per DME Coordinator, patient will need to pay a deductible of 6k in order to  recieve O2 .    LSW notified
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
## 2022-04-14 NOTE — DISCHARGE INSTRUCTIONS
Discharge Instructions    Discharged to home by car with relative. Discharged via wheelchair, hospital escort: Yes.  Special equipment needed: Oxygen    Be sure to schedule a follow-up appointment with your primary care doctor or any specialists as instructed.     Discharge Plan:   Diet Plan: Discussed  Activity Level: Discussed  Confirmed Symptoms Management: Discussed  Medication Reconciliation Updated: Yes    I understand that a diet low in cholesterol, fat, and sodium is recommended for good health. Unless I have been given specific instructions below for another diet, I accept this instruction as my diet prescription.   Other diet: Regular    Special Instructions: None    · Is patient discharged on Warfarin / Coumadin?   No     Depression / Suicide Risk    As you are discharged from this [facility] facility, it is important to learn how to keep safe from harming yourself.    Recognize the warning signs:  · Abrupt changes in personality, positive or negative- including increase in energy   · Giving away possessions  · Change in eating patterns- significant weight changes-  positive or negative  · Change in sleeping patterns- unable to sleep or sleeping all the time   · Unwillingness or inability to communicate  · Depression  · Unusual sadness, discouragement and loneliness  · Talk of wanting to die  · Neglect of personal appearance   · Rebelliousness- reckless behavior  · Withdrawal from people/activities they love  · Confusion- inability to concentrate     If you or a loved one observes any of these behaviors or has concerns about self-harm, here's what you can do:  · Talk about it- your feelings and reasons for harming yourself  · Remove any means that you might use to hurt yourself (examples: pills, rope, extension cords, firearm)  · Get professional help from the community (Mental Health, Substance Abuse, psychological counseling)  · Do not be alone:Call your Safe Contact- someone whom you trust who will  be there for you.  · Call your local CRISIS HOTLINE 669-7775 or 428-559-0089  · Call your local Children's Mobile Crisis Response Team [place] (343) 171-0249 or www.DashLuxe  · Call the toll free National Suicide Prevention Hotlines   · National Suicide Prevention Lifeline 116-768-WOPE (4340)  · [facility] 800-SUICIDE (748-9501)            - Call or seek medical attention for questions or concerns  - Follow up with Dr. Trevor Jefferson in 1 weeks time  - Follow up with Dr. Pickens in 2 weeks for your clavicle fracture  - Follow up with primary care provider within one weeks time  - Resume regular diet  - May take over the counter acetaminophen as needed for pain  - Continue daily over the counter stool softener while on narcotics  - No operation of machinery or motorized vehicles while under the influence of narcotics  - No alcohol, marijuana or illicit drug use while under the influence of narcotics  - In the event of a narcotic overdose naloxone (Narcan) is available without a prescription from any [facility]  - No swimming, hot tubs, baths or wound submersion until cleared by outpatient provider. May shower  - No contact sports, strenuous activities, or heavy lifting until cleared by outpatient provider  - If respiratory decompensation, persistent or worsening pain, or signs or symptoms of infection occur seek medical attention          Clavicle Fracture    A clavicle fracture is a broken collarbone. The collarbone is the long bone that connects your shoulder to your chest wall. A broken collarbone may be treated with a sling or with surgery. Treatment depends on whether the broken ends of the bone are out of place or not.  Follow these instructions at home:  If you have a sling:  · Wear the sling as told by your doctor. Take it off only as told by your doctor.  · Loosen the sling if your fingers tingle, become numb, or turn cold and blue.  · Do not lift your arm. Keep it  across your chest.  · Keep the sling clean.  · Ask your doctor if you may take off the sling for bathing.  ? If your sling is not waterproof, do not let it get wet. Cover the sling with a watertight covering if you take a bath or a shower while wearing it.  ? If you may take off your sling when you take a bath or a shower, keep your shoulder in the same position as when the sling is on.  Managing pain, stiffness, and swelling    · If told, put ice on the injured area:  ? If you have a removable sling, take it off as told by your doctor.  ? Put ice in a plastic bag.  ? Place a towel between your skin and the bag.  ? Leave the ice on for 20 minutes, 2-3 times a day.  Activity  · Avoid activities that make your symptoms worse for 4-6 weeks, or as long as told.  · Ask your doctor when it is safe for you to drive.  · Do exercises as told by your doctor.  General instructions  · Do not use any products that contain nicotine or tobacco, such as cigarettes and e-cigarettes. These can delay bone healing. If you need help quitting, ask your doctor.  · Take over-the-counter and prescription medicines only as told by your doctor.  · Keep all follow-up visits as told by your doctor. This is important.  Contact a doctor if:  · Your medicine is not making you feel less pain.  · Your medicine is not making swelling better.  Get help right away if:  · Your cannot feel your arm (your arm is numb).  · Your arm is cold.  · Your arm is a lighter color than normal.  Summary  · A clavicle fracture is a broken collarbone. The collarbone is the long bone that connects your shoulder to your chest wall.  · Treatment depends on whether the broken ends of the bone are out of place or not.  · If you have a sling, wear it as told by your doctor.  · Do exercises when your doctor says you can. The exercises will help your arm get strong and move like it used to.  This information is not intended to replace advice given to you by your health care  provider. Make sure you discuss any questions you have with your health care provider.  Document Released: 06/05/2009 Document Revised: 11/30/2018 Document Reviewed: 11/06/2017  [employer] Patient Education © 2020 [employer]          Hemothorax    Hemothorax is a buildup of blood in the space between the lungs and the chest wall (pleural cavity). This can cause trouble breathing, a collapsed lung (pneumothorax), and other dangerous problems.  This condition is a medical emergency that must be treated right away in a hospital.  What are the causes?  This condition is most often caused by an injury (trauma) that causes a tear in a lung or in a blood vessel in the chest. Other possible causes include:  · Tuberculosis.  · An injury caused by placing a tube into a blood vessel in the chest (central venous catheter).  · Cancer in the chest.  · A problem with how your blood clots.  · Blood thinner (anticoagulant) medicines.  · Lung surgery or heart surgery.  What are the signs or symptoms?  Signs and symptoms may include:  · Rapid breathing.  · Difficulty breathing.  · Shortness of breath.  · Feeling light-headed.  · Anxiety.  · Restlessness.  · A rapid heart rate.  · Low blood pressure (hypotension).  · Chest pain.  · Skin that is cool, sweaty, pale, or blue.  How is this diagnosed?  This condition may be diagnosed based on:  · Your symptoms and medical history. Your health care provider may ask about any recent injuries you have had.  · A physical exam.  · A chest X-ray.  · Removal and testing of a blood sample from the pleural cavity.  How is this treated?  This condition must be treated at the hospital. Treatment may include:  · A procedure to place a small tube into the pleural cavity (chest tube). The chest tube drains fluid, blood, and extra air. It can also be used to expand a pneumothorax, if needed.  · Surgery to open the chest and control bleeding (thoracotomy). You may need surgery if bleeding continues after  you have a chest tube placed.  · IV fluids.  · Blood transfusion. You may receive:  ? Your own blood that is collected from a chest drainage device and infused back into your body (autotransfusion).  ? Blood from a donor (blood transfusion).  Follow these instructions at home:  Medicines  · Take over-the-counter and prescription medicines only as told by your health care provider.  · Do not drive or use heavy machinery while taking prescription pain medicine.  General instructions  · Return to your normal activities as told by your health care provider. Ask your health care provider what activities are safe for you.  · If a cough or pain makes it difficult for you to sleep at night, try sleeping in a semi-upright position in a recliner or by using 2 or 3 pillows.  · If you were given an incentive spirometer, use it every 1-2 hours while you are awake or as recommended by your health care provider. This device measures how well you are filling your lungs with each breath.  · If you had a chest tube and it was removed, ask your health care provider when you can remove the bandage (dressing). While the dressing is in place, do not allow it to get wet.  · Keep all follow-up visits as told by your health care provider. This is important.  · Do not use any products that contain nicotine or tobacco, such as cigarettes and e-cigarettes. If you need help quitting, ask your health care provider.  Contact a health care provider if:  · Your pain is not controlled with the medicines you were prescribed.  · You were treated with a chest tube, and you have redness, increasing pain, or discharge at the site where it was placed.  · You have a fever.  Get help right away if:  · You have difficulty breathing.  · You begin coughing up blood.  · You have chest pain.  These symptoms may represent a serious problem that is an emergency. Do not wait to see if the symptoms will go away. Get medical help right away. Call your local emergency  services (911 in the [place] Do not drive yourself to the hospital.  Summary  · Hemothorax is a buildup of blood in the space between the lungs and the chest wall (pleural cavity).  · This condition is a medical emergency that must be treated in a hospital right away.  · Hemothorax is most often caused by an injury (trauma) that causes a tear in a lung or in a blood vessel in the chest. Other possible causes include tuberculosis, cancer, blood thinner (anticoagulant) medicines, lung or heart surgery, or a problem with how your blood clots.  · Treatment includes receiving donated blood (blood transfusion) and having a small tube placed in the pleural cavity (chest tube). The tube can help drain blood, fluid, and extra air. It can also be used to expand a collapsed lung (pneumothorax).  · In some cases, surgery is needed to stop the bleeding.  This information is not intended to replace advice given to you by your health care provider. Make sure you discuss any questions you have with your health care provider.  Document Released: 09/13/2005 Document Revised: 01/14/2019 Document Reviewed: 01/14/2019  [employer] Patient Education © 2020 [employer]          Rib Fracture    A rib fracture is a break or crack in one of the bones of the ribs. The ribs are like a cage that goes around your upper chest. A broken or cracked rib is often painful, but most do not cause other problems. Most rib fractures usually heal on their own in 1-3 months.  Follow these instructions at home:  Managing pain, stiffness, and swelling  · If directed, apply ice to the injured area.  ? Put ice in a plastic bag.  ? Place a towel between your skin and the bag.  ? Leave the ice on for 20 minutes, 2-3 times a day.  · Take over-the-counter and prescription medicines only as told by your doctor.  Activity  · Avoid activities that cause pain to the injured area. Protect your injured area.  · Slowly increase activity as told by your doctor.  General  instructions  · Do deep breathing as told by your doctor. You may be told to:  ? Take deep breaths many times a day.  ? Cough many times a day while hugging a pillow.  ? Use a device (incentive spirometer) to do deep breathing many times a day.  · Drink enough fluid to keep your pee (urine) clear or pale yellow.  · Do not wear a rib belt or binder. These do not allow you to breathe deeply.  · Keep all follow-up visits as told by your doctor. This is important.  Contact a doctor if:  · You have a fever.  Get help right away if:  · You have trouble breathing.  · You are short of breath.  · You cannot stop coughing.  · You cough up thick or bloody spit (sputum).  · You feel sick to your stomach (nauseous), throw up (vomit), or have belly (abdominal) pain.  · Your pain gets worse and medicine does not help.  Summary  · A rib fracture is a break or crack in one of the bones of the ribs.  · Apply ice to the injured area and take medicines for pain as told by your doctor.  · Take deep breaths and cough many times a day. Hug a pillow every time you cough.  This information is not intended to replace advice given to you by your health care provider. Make sure you discuss any questions you have with your health care provider.  Document Released: 09/26/2009 Document Revised: 11/30/2018 Document Reviewed: 03/20/2018  [employer] Patient Education © 2020 [employer]

## 2022-04-14 NOTE — DISCHARGE PLANNING
Anticipated Discharge Disposition: Home w/ O2    Action: Spoke with Alejandra @ [facility]. She states pt has a deductible of $6000 and pt will have to pay out of pocket $150/ month due to deductible not being met. Additionally she states dx code and .05 liter flow is not covered by insurance anyway.     Met with pt and pt's father at bedside. Pt states he can't afford out of pocket cost for O2. Obtained approval to complete approved services for 1 month of O2 through [facility]. Called [facility] and spoke with Mat and confirmed they service Luis.     DPA notified     Barriers to Discharge: O2 delivery    Plan: Care coordination will continue to follow and assist with discharge planning

## 2022-04-14 NOTE — DISCHARGE PLANNING
Anticipated Discharge Disposition: Home w/ O2    Action: Met with pt at bedside to discuss d/c and obtain choice for DME. Pt states he will be staying with parents in [place]. Pt doesn't know the address and provides mother's phone #- Jennifer [phone] so that SW can obtain address.     Called pt's mother, Jennifer. Pt is going to [address] [place] [id number].     Choice form completed and faxed to DPA. Notified DPA pt is discharging to parents home and confirmed in PFA notes pt has active [facility] coverage, ID # [phone].     Barriers to Discharge: O2 acceptance/ delivery    Plan: Care coordination will continue to follow and assist with d/c planning

## 2022-04-14 NOTE — THERAPY
Physical Therapy   Daily Treatment     Patient Name: Paco Overton  Age:  47 y.o., Sex:  male  Medical Record #: [id number]  Today's Date: 4/14/2022     Precautions  Precautions: Fall Risk;Non Weight Bearing Left Upper Extremity  Comments: sling L UE for comfort    Assessment    Pt much improved today, also much more agreeable to therapist's education and suggestions. Allowed therapist to adjust sling properly, reported improved comfort. Able to perform transfers and gait without assist. No LOB, no AD. Discussed activity recommendations upon discharge home with parents, pt demonstrated understanding. Also discussed option to sleep in a recliner if bed uncomfortable. Finally recommended once cleared by ortho to see outpatient PT to progress clavicle mobility. Pt demonstrated understanding. No additional acute PT needs.    Plan    Discharge secondary to goals met.    DC Equipment Recommendations: None  Discharge Recommendations: Recommend outpatient physical therapy services to address higher level deficits        Objective       04/14/22 1137   Cognition    Level of Consciousness Alert   Balance   Sitting Balance (Static) Fair +   Sitting Balance (Dynamic) Fair   Standing Balance (Static) Fair   Standing Balance (Dynamic) Fair   Weight Shift Sitting Fair   Weight Shift Standing Fair   Skilled Intervention Verbal Cuing   Comments no aD   Gait Analysis   Gait Level Of Assist Supervised   Assistive Device None   Distance (Feet) 155   # of Times Distance was Traveled 1   Deviation Bradykinetic   Weight Bearing Status NWB L UE   Skilled Intervention Verbal Cuing   Bed Mobility    Supine to Sit   (up in chair)   Sit to Supine   (up in chair)   Scooting Supervised   Skilled Intervention Verbal Cuing   Functional Mobility   Sit to Stand Supervised   Bed, Chair, Wheelchair Transfer Supervised   Skilled Intervention Verbal Cuing   ICU Target Mobility Level   ICU Mobility - Targeted Level Level 4   How much difficulty does the  patient currently have...   Turning over in bed (including adjusting bedclothes, sheets and blankets)? 3   Sitting down on and standing up from a chair with arms (e.g., wheelchair, bedside commode, etc.) 3   Moving from lying on back to sitting on the side of the bed? 3   How much help from another person does the patient currently need...   Moving to and from a bed to a chair (including a wheelchair)? 3   Need to walk in a hospital room? 3   Climbing 3-5 steps with a railing? 3   6 clicks Mobility Score 18   Short Term Goals    Short Term Goal # 1 Pt will perform supine <> sit with SPV in 6 visits to get in/out of bed   Goal Outcome # 1   (not witnessed, reported capable)   Short Term Goal # 2 Pt will perform functional transfers with SPV in 6 visits to increase indpeendence   Goal Outcome # 2 Goal met   Short Term Goal # 3 Pt will ambulate 150ft with no AD and SPV in 6 visits to increase independence   Goal Outcome # 3 Goal met
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
## 2022-04-14 NOTE — DISCHARGE PLANNING
Received Choice form at 1042  Agency/Facility Name: [facility]   Referral sent per Choice form @ 1100    LSW notified

## 2022-04-15 NOTE — PROGRESS NOTES
Late entry:  Home 02 delivered to bedside by Levy.      1605:  PIV removed.  Patient got dressed with min assistance of Joel FLORES.    1615:  RN reviewed discharge instructions with patient and his father.  They will  prescriptions on their way home.  They will  home 02 on their way home.      1620:  Patient discharged home with personal belongings including cell phone, , small box (care package), personal belongings bag with cut clothing. Escorted to vehicle accompanied by Evaristo BELL.

## 2022-04-21 ENCOUNTER — OFFICE VISIT (OUTPATIENT)
Dept: PHYSICAL MEDICINE AND REHAB | Facility: REHABILITATION | Age: 47
End: 2022-04-21
Payer: COMMERCIAL

## 2022-04-21 VITALS
RESPIRATION RATE: 18 BRPM | TEMPERATURE: 97.4 F | OXYGEN SATURATION: 98 % | HEART RATE: 70 BPM | SYSTOLIC BLOOD PRESSURE: 122 MMHG | DIASTOLIC BLOOD PRESSURE: 78 MMHG

## 2022-04-21 DIAGNOSIS — S42.022A CLOSED DISPLACED FRACTURE OF SHAFT OF LEFT CLAVICLE, INITIAL ENCOUNTER: Primary | ICD-10-CM

## 2022-04-21 PROCEDURE — 99214 OFFICE O/P EST MOD 30 MIN: CPT | Performed by: PHYSICAL MEDICINE & REHABILITATION
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
